# Patient Record
Sex: MALE | Race: WHITE | NOT HISPANIC OR LATINO | Employment: FULL TIME | URBAN - METROPOLITAN AREA
[De-identification: names, ages, dates, MRNs, and addresses within clinical notes are randomized per-mention and may not be internally consistent; named-entity substitution may affect disease eponyms.]

---

## 2021-10-03 ENCOUNTER — HOSPITAL ENCOUNTER (EMERGENCY)
Facility: HOSPITAL | Age: 36
Discharge: HOME | End: 2021-10-03
Admitting: EMERGENCY MEDICINE

## 2021-10-03 VITALS
TEMPERATURE: 98.6 F | DIASTOLIC BLOOD PRESSURE: 65 MMHG | OXYGEN SATURATION: 99 % | RESPIRATION RATE: 16 BRPM | SYSTOLIC BLOOD PRESSURE: 113 MMHG | WEIGHT: 203.93 LBS | HEIGHT: 71 IN | HEART RATE: 100 BPM | BODY MASS INDEX: 28.55 KG/M2

## 2021-10-03 DIAGNOSIS — L02.415 ABSCESS OF RIGHT LEG: Primary | ICD-10-CM

## 2021-10-03 DIAGNOSIS — R50.9 FEVER AND CHILLS: ICD-10-CM

## 2021-10-03 DIAGNOSIS — M79.604 RIGHT LEG PAIN: ICD-10-CM

## 2021-10-03 LAB
ALBUMIN SERPL-MCNC: 3.5 G/DL (ref 3.5–5.2)
ALBUMIN/GLOB SERPL: 0.7 G/DL
ALP SERPL-CCNC: 65 U/L (ref 39–117)
ALT SERPL W P-5'-P-CCNC: 14 U/L (ref 1–41)
ANION GAP SERPL CALCULATED.3IONS-SCNC: 13 MMOL/L (ref 5–15)
AST SERPL-CCNC: 19 U/L (ref 1–40)
BASOPHILS # BLD AUTO: 0 10*3/MM3 (ref 0–0.2)
BASOPHILS NFR BLD AUTO: 0.3 % (ref 0–1.5)
BILIRUB SERPL-MCNC: 0.6 MG/DL (ref 0–1.2)
BUN SERPL-MCNC: 10 MG/DL (ref 6–20)
BUN/CREAT SERPL: 10.1 (ref 7–25)
CALCIUM SPEC-SCNC: 8.4 MG/DL (ref 8.6–10.5)
CHLORIDE SERPL-SCNC: 95 MMOL/L (ref 98–107)
CO2 SERPL-SCNC: 23 MMOL/L (ref 22–29)
CREAT SERPL-MCNC: 0.99 MG/DL (ref 0.76–1.27)
D-LACTATE SERPL-SCNC: 1.2 MMOL/L (ref 0.5–2)
DEPRECATED RDW RBC AUTO: 40.3 FL (ref 37–54)
EOSINOPHIL # BLD AUTO: 0 10*3/MM3 (ref 0–0.4)
EOSINOPHIL NFR BLD AUTO: 0.4 % (ref 0.3–6.2)
ERYTHROCYTE [DISTWIDTH] IN BLOOD BY AUTOMATED COUNT: 13.3 % (ref 12.3–15.4)
GFR SERPL CREATININE-BSD FRML MDRD: 87 ML/MIN/1.73
GLOBULIN UR ELPH-MCNC: 5.1 GM/DL
GLUCOSE SERPL-MCNC: 73 MG/DL (ref 65–99)
HCT VFR BLD AUTO: 37.2 % (ref 37.5–51)
HGB BLD-MCNC: 12.6 G/DL (ref 13–17.7)
LYMPHOCYTES # BLD AUTO: 1.2 10*3/MM3 (ref 0.7–3.1)
LYMPHOCYTES NFR BLD AUTO: 20.6 % (ref 19.6–45.3)
MCH RBC QN AUTO: 28.9 PG (ref 26.6–33)
MCHC RBC AUTO-ENTMCNC: 33.9 G/DL (ref 31.5–35.7)
MCV RBC AUTO: 85.1 FL (ref 79–97)
MONOCYTES # BLD AUTO: 0.8 10*3/MM3 (ref 0.1–0.9)
MONOCYTES NFR BLD AUTO: 14.3 % (ref 5–12)
NEUTROPHILS NFR BLD AUTO: 3.7 10*3/MM3 (ref 1.7–7)
NEUTROPHILS NFR BLD AUTO: 64.4 % (ref 42.7–76)
NRBC BLD AUTO-RTO: 0.2 /100 WBC (ref 0–0.2)
PLATELET # BLD AUTO: 230 10*3/MM3 (ref 140–450)
PMV BLD AUTO: 8.2 FL (ref 6–12)
POTASSIUM SERPL-SCNC: 3.7 MMOL/L (ref 3.5–5.2)
PROT SERPL-MCNC: 8.6 G/DL (ref 6–8.5)
RBC # BLD AUTO: 4.37 10*6/MM3 (ref 4.14–5.8)
SODIUM SERPL-SCNC: 131 MMOL/L (ref 136–145)
WBC # BLD AUTO: 5.8 10*3/MM3 (ref 3.4–10.8)

## 2021-10-03 PROCEDURE — 87070 CULTURE OTHR SPECIMN AEROBIC: CPT | Performed by: NURSE PRACTITIONER

## 2021-10-03 PROCEDURE — 80053 COMPREHEN METABOLIC PANEL: CPT | Performed by: NURSE PRACTITIONER

## 2021-10-03 PROCEDURE — 96375 TX/PRO/DX INJ NEW DRUG ADDON: CPT

## 2021-10-03 PROCEDURE — 87186 SC STD MICRODIL/AGAR DIL: CPT | Performed by: NURSE PRACTITIONER

## 2021-10-03 PROCEDURE — 87205 SMEAR GRAM STAIN: CPT | Performed by: NURSE PRACTITIONER

## 2021-10-03 PROCEDURE — 83605 ASSAY OF LACTIC ACID: CPT

## 2021-10-03 PROCEDURE — 87147 CULTURE TYPE IMMUNOLOGIC: CPT | Performed by: NURSE PRACTITIONER

## 2021-10-03 PROCEDURE — 25010000002 ONDANSETRON PER 1 MG: Performed by: NURSE PRACTITIONER

## 2021-10-03 PROCEDURE — 87150 DNA/RNA AMPLIFIED PROBE: CPT | Performed by: NURSE PRACTITIONER

## 2021-10-03 PROCEDURE — 96374 THER/PROPH/DIAG INJ IV PUSH: CPT

## 2021-10-03 PROCEDURE — 85025 COMPLETE CBC W/AUTO DIFF WBC: CPT | Performed by: NURSE PRACTITIONER

## 2021-10-03 PROCEDURE — 87040 BLOOD CULTURE FOR BACTERIA: CPT | Performed by: NURSE PRACTITIONER

## 2021-10-03 PROCEDURE — 25010000002 HYDROMORPHONE PER 4 MG: Performed by: NURSE PRACTITIONER

## 2021-10-03 PROCEDURE — 99283 EMERGENCY DEPT VISIT LOW MDM: CPT

## 2021-10-03 PROCEDURE — 25010000002 CEFTRIAXONE PER 250 MG: Performed by: NURSE PRACTITIONER

## 2021-10-03 RX ORDER — HYDROMORPHONE HCL 110MG/55ML
1 PATIENT CONTROLLED ANALGESIA SYRINGE INTRAVENOUS ONCE
Status: COMPLETED | OUTPATIENT
Start: 2021-10-03 | End: 2021-10-03

## 2021-10-03 RX ORDER — ONDANSETRON 2 MG/ML
4 INJECTION INTRAMUSCULAR; INTRAVENOUS ONCE
Status: COMPLETED | OUTPATIENT
Start: 2021-10-03 | End: 2021-10-03

## 2021-10-03 RX ORDER — SODIUM CHLORIDE 0.9 % (FLUSH) 0.9 %
10 SYRINGE (ML) INJECTION AS NEEDED
Status: DISCONTINUED | OUTPATIENT
Start: 2021-10-03 | End: 2021-10-03 | Stop reason: HOSPADM

## 2021-10-03 RX ORDER — HYDROCODONE BITARTRATE AND ACETAMINOPHEN 7.5; 325 MG/1; MG/1
1 TABLET ORAL EVERY 6 HOURS PRN
Qty: 15 TABLET | Refills: 0 | Status: ON HOLD | OUTPATIENT
Start: 2021-10-03 | End: 2021-11-06

## 2021-10-03 RX ORDER — ACETAMINOPHEN 500 MG
1000 TABLET ORAL ONCE
Status: COMPLETED | OUTPATIENT
Start: 2021-10-03 | End: 2021-10-03

## 2021-10-03 RX ADMIN — ACETAMINOPHEN 1000 MG: 500 TABLET, FILM COATED ORAL at 20:38

## 2021-10-03 RX ADMIN — ONDANSETRON 4 MG: 2 INJECTION INTRAMUSCULAR; INTRAVENOUS at 20:38

## 2021-10-03 RX ADMIN — HYDROMORPHONE HYDROCHLORIDE 1 MG: 2 INJECTION, SOLUTION INTRAMUSCULAR; INTRAVENOUS; SUBCUTANEOUS at 20:38

## 2021-10-03 RX ADMIN — CEFTRIAXONE 1 G: 10 INJECTION, POWDER, FOR SOLUTION INTRAVENOUS at 20:47

## 2021-10-05 LAB
BACTERIA BLD CULT: ABNORMAL
BACTERIA SPEC AEROBE CULT: ABNORMAL
BOTTLE TYPE: ABNORMAL
GRAM STN SPEC: ABNORMAL
GRAM STN SPEC: ABNORMAL

## 2021-10-05 PROCEDURE — 99284 EMERGENCY DEPT VISIT MOD MDM: CPT

## 2021-10-06 ENCOUNTER — HOSPITAL ENCOUNTER (INPATIENT)
Facility: HOSPITAL | Age: 36
LOS: 1 days | Discharge: HOME | End: 2021-10-06
Attending: EMERGENCY MEDICINE | Admitting: HOSPITALIST

## 2021-10-06 ENCOUNTER — APPOINTMENT (OUTPATIENT)
Dept: CARDIOLOGY | Facility: HOSPITAL | Age: 36
End: 2021-10-06

## 2021-10-06 VITALS
HEIGHT: 71 IN | BODY MASS INDEX: 23.8 KG/M2 | OXYGEN SATURATION: 100 % | WEIGHT: 170 LBS | TEMPERATURE: 98.4 F | DIASTOLIC BLOOD PRESSURE: 90 MMHG | HEART RATE: 92 BPM | SYSTOLIC BLOOD PRESSURE: 133 MMHG | RESPIRATION RATE: 18 BRPM

## 2021-10-06 DIAGNOSIS — R78.81 POSITIVE BLOOD CULTURE: ICD-10-CM

## 2021-10-06 DIAGNOSIS — L02.415 CELLULITIS AND ABSCESS OF RIGHT LOWER EXTREMITY: Primary | ICD-10-CM

## 2021-10-06 DIAGNOSIS — L03.115 CELLULITIS AND ABSCESS OF RIGHT LOWER EXTREMITY: Primary | ICD-10-CM

## 2021-10-06 PROBLEM — B95.62 MRSA BACTEREMIA: Status: ACTIVE | Noted: 2021-10-06

## 2021-10-06 LAB
ALBUMIN SERPL-MCNC: 3.4 G/DL (ref 3.5–5.2)
ALBUMIN/GLOB SERPL: 0.7 G/DL
ALP SERPL-CCNC: 59 U/L (ref 39–117)
ALT SERPL W P-5'-P-CCNC: 14 U/L (ref 1–41)
ANION GAP SERPL CALCULATED.3IONS-SCNC: 14 MMOL/L (ref 5–15)
AST SERPL-CCNC: 24 U/L (ref 1–40)
BASOPHILS # BLD AUTO: 0 10*3/MM3 (ref 0–0.2)
BASOPHILS NFR BLD AUTO: 0.4 % (ref 0–1.5)
BILIRUB SERPL-MCNC: 0.3 MG/DL (ref 0–1.2)
BUN SERPL-MCNC: 11 MG/DL (ref 6–20)
BUN/CREAT SERPL: 9 (ref 7–25)
CALCIUM SPEC-SCNC: 8.5 MG/DL (ref 8.6–10.5)
CHLORIDE SERPL-SCNC: 98 MMOL/L (ref 98–107)
CO2 SERPL-SCNC: 24 MMOL/L (ref 22–29)
CREAT SERPL-MCNC: 1.22 MG/DL (ref 0.76–1.27)
D-LACTATE SERPL-SCNC: 0.5 MMOL/L (ref 0.5–2)
D-LACTATE SERPL-SCNC: 1.2 MMOL/L (ref 0.5–2)
DEPRECATED RDW RBC AUTO: 40.7 FL (ref 37–54)
EOSINOPHIL # BLD AUTO: 0.1 10*3/MM3 (ref 0–0.4)
EOSINOPHIL NFR BLD AUTO: 3 % (ref 0.3–6.2)
ERYTHROCYTE [DISTWIDTH] IN BLOOD BY AUTOMATED COUNT: 13.4 % (ref 12.3–15.4)
GFR SERPL CREATININE-BSD FRML MDRD: 68 ML/MIN/1.73
GLOBULIN UR ELPH-MCNC: 5.2 GM/DL
GLUCOSE BLDC GLUCOMTR-MCNC: 58 MG/DL (ref 70–105)
GLUCOSE BLDC GLUCOMTR-MCNC: 60 MG/DL (ref 70–105)
GLUCOSE SERPL-MCNC: 69 MG/DL (ref 65–99)
HCT VFR BLD AUTO: 34.7 % (ref 37.5–51)
HGB BLD-MCNC: 11.7 G/DL (ref 13–17.7)
HOLD SPECIMEN: NORMAL
HOLD SPECIMEN: NORMAL
LYMPHOCYTES # BLD AUTO: 0.6 10*3/MM3 (ref 0.7–3.1)
LYMPHOCYTES NFR BLD AUTO: 20.3 % (ref 19.6–45.3)
MCH RBC QN AUTO: 29 PG (ref 26.6–33)
MCHC RBC AUTO-ENTMCNC: 33.8 G/DL (ref 31.5–35.7)
MCV RBC AUTO: 85.8 FL (ref 79–97)
MONOCYTES # BLD AUTO: 0.5 10*3/MM3 (ref 0.1–0.9)
MONOCYTES NFR BLD AUTO: 15.8 % (ref 5–12)
NEUTROPHILS NFR BLD AUTO: 1.8 10*3/MM3 (ref 1.7–7)
NEUTROPHILS NFR BLD AUTO: 60.5 % (ref 42.7–76)
NRBC BLD AUTO-RTO: 0.1 /100 WBC (ref 0–0.2)
PLATELET # BLD AUTO: 225 10*3/MM3 (ref 140–450)
PMV BLD AUTO: 8.3 FL (ref 6–12)
POTASSIUM SERPL-SCNC: 3.7 MMOL/L (ref 3.5–5.2)
PROT SERPL-MCNC: 8.6 G/DL (ref 6–8.5)
RBC # BLD AUTO: 4.04 10*6/MM3 (ref 4.14–5.8)
SARS-COV-2 RNA PNL SPEC NAA+PROBE: NOT DETECTED
SODIUM SERPL-SCNC: 136 MMOL/L (ref 136–145)
WBC # BLD AUTO: 3 10*3/MM3 (ref 3.4–10.8)
WHOLE BLOOD HOLD SPECIMEN: NORMAL
WHOLE BLOOD HOLD SPECIMEN: NORMAL

## 2021-10-06 PROCEDURE — 93306 TTE W/DOPPLER COMPLETE: CPT | Performed by: INTERNAL MEDICINE

## 2021-10-06 PROCEDURE — 25010000002 MORPHINE PER 10 MG: Performed by: EMERGENCY MEDICINE

## 2021-10-06 PROCEDURE — 25010000002 ONDANSETRON PER 1 MG: Performed by: EMERGENCY MEDICINE

## 2021-10-06 PROCEDURE — 82962 GLUCOSE BLOOD TEST: CPT

## 2021-10-06 PROCEDURE — 25010000002 VANCOMYCIN 10 G RECONSTITUTED SOLUTION: Performed by: EMERGENCY MEDICINE

## 2021-10-06 PROCEDURE — 80053 COMPREHEN METABOLIC PANEL: CPT | Performed by: EMERGENCY MEDICINE

## 2021-10-06 PROCEDURE — 99222 1ST HOSP IP/OBS MODERATE 55: CPT | Performed by: HOSPITALIST

## 2021-10-06 PROCEDURE — 87040 BLOOD CULTURE FOR BACTERIA: CPT | Performed by: EMERGENCY MEDICINE

## 2021-10-06 PROCEDURE — 25010000002 CEFEPIME PER 500 MG: Performed by: HOSPITALIST

## 2021-10-06 PROCEDURE — 99222 1ST HOSP IP/OBS MODERATE 55: CPT | Performed by: SURGERY

## 2021-10-06 PROCEDURE — 83605 ASSAY OF LACTIC ACID: CPT | Performed by: NURSE PRACTITIONER

## 2021-10-06 PROCEDURE — 25010000002 VANCOMYCIN PER 500 MG: Performed by: EMERGENCY MEDICINE

## 2021-10-06 PROCEDURE — 25010000002 VANCOMYCIN 1 G RECONSTITUTED SOLUTION 1 EACH VIAL: Performed by: HOSPITALIST

## 2021-10-06 PROCEDURE — U0003 INFECTIOUS AGENT DETECTION BY NUCLEIC ACID (DNA OR RNA); SEVERE ACUTE RESPIRATORY SYNDROME CORONAVIRUS 2 (SARS-COV-2) (CORONAVIRUS DISEASE [COVID-19]), AMPLIFIED PROBE TECHNIQUE, MAKING USE OF HIGH THROUGHPUT TECHNOLOGIES AS DESCRIBED BY CMS-2020-01-R: HCPCS | Performed by: NURSE PRACTITIONER

## 2021-10-06 PROCEDURE — 25010000002 PIPERACILLIN SOD-TAZOBACTAM PER 1 G: Performed by: EMERGENCY MEDICINE

## 2021-10-06 PROCEDURE — 83605 ASSAY OF LACTIC ACID: CPT

## 2021-10-06 PROCEDURE — 36415 COLL VENOUS BLD VENIPUNCTURE: CPT | Performed by: NURSE PRACTITIONER

## 2021-10-06 PROCEDURE — 85025 COMPLETE CBC W/AUTO DIFF WBC: CPT | Performed by: EMERGENCY MEDICINE

## 2021-10-06 PROCEDURE — 93306 TTE W/DOPPLER COMPLETE: CPT

## 2021-10-06 RX ORDER — ERGOCALCIFEROL 1.25 MG/1
50000 CAPSULE ORAL WEEKLY
COMMUNITY

## 2021-10-06 RX ORDER — DOXYCYCLINE HYCLATE 100 MG/1
100 CAPSULE ORAL 2 TIMES DAILY
Qty: 28 CAPSULE | Refills: 0 | Status: ON HOLD | OUTPATIENT
Start: 2021-10-06 | End: 2021-11-06

## 2021-10-06 RX ORDER — ONDANSETRON 4 MG/1
4 TABLET, FILM COATED ORAL EVERY 6 HOURS PRN
Status: DISCONTINUED | OUTPATIENT
Start: 2021-10-06 | End: 2021-10-06 | Stop reason: HOSPADM

## 2021-10-06 RX ORDER — VANCOMYCIN 1.75 GRAM/500 ML IN 0.9 % SODIUM CHLORIDE INTRAVENOUS
20 ONCE
Status: COMPLETED | OUTPATIENT
Start: 2021-10-06 | End: 2021-10-06

## 2021-10-06 RX ORDER — DARUNAVIR ETHANOLATE AND COBICISTAT 800; 150 MG/1; MG/1
1 TABLET, FILM COATED ORAL DAILY
COMMUNITY

## 2021-10-06 RX ORDER — CHOLECALCIFEROL (VITAMIN D3) 125 MCG
5 CAPSULE ORAL NIGHTLY PRN
Status: DISCONTINUED | OUTPATIENT
Start: 2021-10-06 | End: 2021-10-06 | Stop reason: HOSPADM

## 2021-10-06 RX ORDER — EMTRICITABINE AND TENOFOVIR ALAFENAMIDE 200; 25 MG/1; MG/1
1 TABLET ORAL DAILY
COMMUNITY

## 2021-10-06 RX ORDER — DEXTROAMPHETAMINE SACCHARATE, AMPHETAMINE ASPARTATE, DEXTROAMPHETAMINE SULFATE AND AMPHETAMINE SULFATE 7.5; 7.5; 7.5; 7.5 MG/1; MG/1; MG/1; MG/1
30 TABLET ORAL 2 TIMES DAILY
COMMUNITY
Start: 2021-08-24 | End: 2021-11-09 | Stop reason: HOSPADM

## 2021-10-06 RX ORDER — SODIUM CHLORIDE 9 MG/ML
100 INJECTION, SOLUTION INTRAVENOUS CONTINUOUS
Status: DISCONTINUED | OUTPATIENT
Start: 2021-10-06 | End: 2021-10-06 | Stop reason: HOSPADM

## 2021-10-06 RX ORDER — SODIUM CHLORIDE 0.9 % (FLUSH) 0.9 %
10 SYRINGE (ML) INJECTION AS NEEDED
Status: DISCONTINUED | OUTPATIENT
Start: 2021-10-06 | End: 2021-10-06 | Stop reason: HOSPADM

## 2021-10-06 RX ORDER — HYDROXYZINE HYDROCHLORIDE 25 MG/1
25 TABLET, FILM COATED ORAL 3 TIMES DAILY PRN
Status: DISCONTINUED | OUTPATIENT
Start: 2021-10-06 | End: 2021-10-06 | Stop reason: HOSPADM

## 2021-10-06 RX ORDER — ACETAMINOPHEN 160 MG/5ML
650 SOLUTION ORAL EVERY 4 HOURS PRN
Status: DISCONTINUED | OUTPATIENT
Start: 2021-10-06 | End: 2021-10-06 | Stop reason: HOSPADM

## 2021-10-06 RX ORDER — ACETAMINOPHEN 325 MG/1
650 TABLET ORAL EVERY 4 HOURS PRN
Status: DISCONTINUED | OUTPATIENT
Start: 2021-10-06 | End: 2021-10-06 | Stop reason: HOSPADM

## 2021-10-06 RX ORDER — SODIUM HYPOCHLORITE 1.25 MG/ML
SOLUTION TOPICAL 2 TIMES DAILY
Status: DISCONTINUED | OUTPATIENT
Start: 2021-10-06 | End: 2021-10-06 | Stop reason: HOSPADM

## 2021-10-06 RX ORDER — ONDANSETRON 2 MG/ML
4 INJECTION INTRAMUSCULAR; INTRAVENOUS ONCE
Status: COMPLETED | OUTPATIENT
Start: 2021-10-06 | End: 2021-10-06

## 2021-10-06 RX ORDER — BUSPIRONE HYDROCHLORIDE 30 MG/1
30 TABLET ORAL 2 TIMES DAILY
COMMUNITY
Start: 2021-07-09

## 2021-10-06 RX ORDER — SODIUM CHLORIDE 0.9 % (FLUSH) 0.9 %
3 SYRINGE (ML) INJECTION EVERY 12 HOURS SCHEDULED
Status: DISCONTINUED | OUTPATIENT
Start: 2021-10-06 | End: 2021-10-06 | Stop reason: HOSPADM

## 2021-10-06 RX ORDER — MORPHINE SULFATE 4 MG/ML
4 INJECTION, SOLUTION INTRAMUSCULAR; INTRAVENOUS ONCE
Status: COMPLETED | OUTPATIENT
Start: 2021-10-06 | End: 2021-10-06

## 2021-10-06 RX ORDER — ONDANSETRON 2 MG/ML
4 INJECTION INTRAMUSCULAR; INTRAVENOUS EVERY 6 HOURS PRN
Status: DISCONTINUED | OUTPATIENT
Start: 2021-10-06 | End: 2021-10-06 | Stop reason: HOSPADM

## 2021-10-06 RX ORDER — ERGOCALCIFEROL 1.25 MG/1
50000 CAPSULE ORAL WEEKLY
Status: DISCONTINUED | OUTPATIENT
Start: 2021-10-07 | End: 2021-10-06 | Stop reason: HOSPADM

## 2021-10-06 RX ORDER — CEPHALEXIN 500 MG/1
500 CAPSULE ORAL 2 TIMES DAILY
COMMUNITY
Start: 2021-10-02 | End: 2021-10-06 | Stop reason: HOSPADM

## 2021-10-06 RX ORDER — BUSPIRONE HYDROCHLORIDE 15 MG/1
30 TABLET ORAL EVERY 12 HOURS SCHEDULED
Status: DISCONTINUED | OUTPATIENT
Start: 2021-10-06 | End: 2021-10-06 | Stop reason: HOSPADM

## 2021-10-06 RX ORDER — SULFAMETHOXAZOLE AND TRIMETHOPRIM 800; 160 MG/1; MG/1
1 TABLET ORAL 2 TIMES DAILY
COMMUNITY
Start: 2021-10-02 | End: 2021-10-06 | Stop reason: HOSPADM

## 2021-10-06 RX ORDER — SODIUM CHLORIDE 0.9 % (FLUSH) 0.9 %
3-10 SYRINGE (ML) INJECTION AS NEEDED
Status: DISCONTINUED | OUTPATIENT
Start: 2021-10-06 | End: 2021-10-06 | Stop reason: HOSPADM

## 2021-10-06 RX ORDER — ACETAMINOPHEN 650 MG/1
650 SUPPOSITORY RECTAL EVERY 4 HOURS PRN
Status: DISCONTINUED | OUTPATIENT
Start: 2021-10-06 | End: 2021-10-06 | Stop reason: HOSPADM

## 2021-10-06 RX ORDER — HYDROCODONE BITARTRATE AND ACETAMINOPHEN 7.5; 325 MG/1; MG/1
1 TABLET ORAL EVERY 6 HOURS PRN
Status: DISCONTINUED | OUTPATIENT
Start: 2021-10-06 | End: 2021-10-06 | Stop reason: HOSPADM

## 2021-10-06 RX ADMIN — MORPHINE SULFATE 4 MG: 4 INJECTION INTRAVENOUS at 01:32

## 2021-10-06 RX ADMIN — VANCOMYCIN HYDROCHLORIDE 1000 MG: 1 INJECTION, POWDER, LYOPHILIZED, FOR SOLUTION INTRAVENOUS at 14:53

## 2021-10-06 RX ADMIN — VANCOMYCIN HYDROCHLORIDE 1750 MG: 500 INJECTION, POWDER, LYOPHILIZED, FOR SOLUTION INTRAVENOUS at 01:32

## 2021-10-06 RX ADMIN — DAKIN'S SOLUTION 0.125% (QUARTER STRENGTH): 0.12 SOLUTION at 12:00

## 2021-10-06 RX ADMIN — PIPERACILLIN AND TAZOBACTAM 3.38 G: 3; .375 INJECTION, POWDER, LYOPHILIZED, FOR SOLUTION INTRAVENOUS at 01:32

## 2021-10-06 RX ADMIN — BUSPIRONE HYDROCHLORIDE 30 MG: 15 TABLET ORAL at 14:53

## 2021-10-06 RX ADMIN — SODIUM CHLORIDE 1000 ML: 9 INJECTION, SOLUTION INTRAVENOUS at 01:32

## 2021-10-06 RX ADMIN — ONDANSETRON 4 MG: 2 INJECTION INTRAMUSCULAR; INTRAVENOUS at 01:32

## 2021-10-06 RX ADMIN — Medication 3 ML: at 11:19

## 2021-10-06 RX ADMIN — SODIUM CHLORIDE 100 ML/HR: 9 INJECTION, SOLUTION INTRAVENOUS at 03:34

## 2021-10-06 RX ADMIN — Medication 3 ML: at 03:35

## 2021-10-06 RX ADMIN — CEFEPIME HYDROCHLORIDE 2 G: 2 INJECTION, POWDER, FOR SOLUTION INTRAVENOUS at 11:18

## 2021-10-06 NOTE — NURSING NOTE
WOCN note:    36 yr old male admitted 10/6/21 for MRSA positive blood and wound cultures from an abscess to right posterior upper thigh. Patient had an I&D in the ED on 10/2 and area of erythema was marked at that time.     Patient has packing gauze in place draining moderate amount serosanguineous drainage. Induration noted to lateral edge of wound but no erythema. Wound measures 1x1x2.5 and is tender to touch.     Recommend lightly packing with Dakins moistened strip gauze or 2x2 twice daily. Will continue to follow as needed.

## 2021-10-06 NOTE — ED PROVIDER NOTES
Subjective   36-year-old male who was seen here on Sunday for right posterior thigh abscess/cellulitis, underwent incision and drainage.  Patient was discharged on Keflex/Bactrim/Norco.  Patient states his thigh actually feels a little better in terms of pain.  Patient did develop some diaphoresis and subjective fever this afternoon.  Patient had Covid in late September but feels like he is recovered from that the most part.  Patient denies any history of recurrent abscesses or soft tissue infection.  Symptoms are overall moderate, worse with movement.  Patient had wound culture positive for MRSA sensitive to vancomycin and Bactrim.  Patient reports compliance with his antibiotics.  Blood culture positive as well.          Review of Systems   Constitutional: Positive for fever.   Musculoskeletal:        As per HPI   Skin: Positive for wound.   All other systems reviewed and are negative.      No past medical history on file.    No Known Allergies    No past surgical history on file.    No family history on file.    Social History     Socioeconomic History   • Marital status: Single     Spouse name: Not on file   • Number of children: Not on file   • Years of education: Not on file   • Highest education level: Not on file           Objective   Physical Exam  Constitutional:       Appearance: Normal appearance.   HENT:      Head: Normocephalic and atraumatic.      Mouth/Throat:      Mouth: Mucous membranes are moist.      Pharynx: Oropharynx is clear.   Eyes:      Conjunctiva/sclera: Conjunctivae normal.      Pupils: Pupils are equal, round, and reactive to light.   Cardiovascular:      Rate and Rhythm: Tachycardia present.      Pulses: Normal pulses.      Heart sounds: Normal heart sounds.   Pulmonary:      Effort: Pulmonary effort is normal.      Breath sounds: Normal breath sounds.   Abdominal:      General: Bowel sounds are normal. There is no distension.      Palpations: Abdomen is soft.      Tenderness: There is  no abdominal tenderness.   Musculoskeletal:      Comments: Right posterior thigh with packed wound with surrounding erythema and some blistering at the cephalad aspect.  Erythema encompasses most of the posterior thigh.  There is approximately a 5 cm halo of induration and tenderness around the wound.  Packing was removed, a mild amount of purulence can be expressed but I am not convinced further incision will assist this gentleman.  Wound was repacked, redness will be outlined with wound pen   Skin:     Capillary Refill: Capillary refill takes less than 2 seconds.   Neurological:      General: No focal deficit present.      Mental Status: He is alert and oriented to person, place, and time.   Psychiatric:         Mood and Affect: Mood normal.         Behavior: Behavior normal.         Procedures           ED Course                                           MDM  Number of Diagnoses or Management Options  Cellulitis and abscess of right lower extremity  Positive blood culture  Diagnosis management comments: Results for orders placed or performed during the hospital encounter of 10/06/21  -Comprehensive Metabolic Panel  Specimen: Blood       Result                      Value             Ref Range           Glucose                     69                65 - 99 mg/dL       BUN                         11                6 - 20 mg/dL        Creatinine                  1.22              0.76 - 1.27 *       Sodium                      136               136 - 145 mm*       Potassium                   3.7               3.5 - 5.2 mm*       Chloride                    98                98 - 107 mmo*       CO2                         24.0              22.0 - 29.0 *       Calcium                     8.5 (L)           8.6 - 10.5 m*       Total Protein               8.6 (H)           6.0 - 8.5 g/*       Albumin                     3.40 (L)          3.50 - 5.20 *       ALT (SGPT)                  14                1 - 41 U/L           AST (SGOT)                  24                1 - 40 U/L          Alkaline Phosphatase        59                39 - 117 U/L        Total Bilirubin             0.3               0.0 - 1.2 mg*       eGFR Non  Amer       68                >60 mL/min/1*       Globulin                    5.2               gm/dL               A/G Ratio                   0.7               g/dL                BUN/Creatinine Ratio        9.0               7.0 - 25.0          Anion Gap                   14.0              5.0 - 15.0 m*  -CBC Auto Differential  Specimen: Blood       Result                      Value             Ref Range           WBC                         3.00 (L)          3.40 - 10.80*       RBC                         4.04 (L)          4.14 - 5.80 *       Hemoglobin                  11.7 (L)          13.0 - 17.7 *       Hematocrit                  34.7 (L)          37.5 - 51.0 %       MCV                         85.8              79.0 - 97.0 *       MCH                         29.0              26.6 - 33.0 *       MCHC                        33.8              31.5 - 35.7 *       RDW                         13.4              12.3 - 15.4 %       RDW-SD                      40.7              37.0 - 54.0 *       MPV                         8.3               6.0 - 12.0 fL       Platelets                   225               140 - 450 10*       Neutrophil %                60.5              42.7 - 76.0 %       Lymphocyte %                20.3              19.6 - 45.3 %       Monocyte %                  15.8 (H)          5.0 - 12.0 %        Eosinophil %                3.0               0.3 - 6.2 %         Basophil %                  0.4               0.0 - 1.5 %         Neutrophils, Absolute       1.80              1.70 - 7.00 *       Lymphocytes, Absolute       0.60 (L)          0.70 - 3.10 *       Monocytes, Absolute         0.50              0.10 - 0.90 *       Eosinophils, Absolute       0.10              0.00 - 0.40  *       Basophils, Absolute         0.00              0.00 - 0.20 *       nRBC                        0.1               0.0 - 0.2 /1*  -POC Lactate  Specimen: Blood       Result                      Value             Ref Range           Lactate                     1.2               0.5 - 2.0 mm*  -Green Top (Gel)       Result                      Value             Ref Range           Extra Tube                  hold                             -Lavender Top       Result                      Value             Ref Range           Extra Tube                                                    hold for add-on  -Gold Top - SST       Result                      Value             Ref Range           Extra Tube                                                    Hold for add-ons.  -Light Blue Top       Result                      Value             Ref Range           Extra Tube                                                    hold for add-on    Patient hemodynamically stable with 1/2+ blood cultures, will observe for IV antibiotics       Amount and/or Complexity of Data Reviewed  Clinical lab tests: ordered and reviewed  Tests in the medicine section of CPT®: ordered and reviewed  Decide to obtain previous medical records or to obtain history from someone other than the patient: yes        Final diagnoses:   Cellulitis and abscess of right lower extremity   Positive blood culture       ED Disposition  ED Disposition     ED Disposition Condition Comment    Decision to Admit  Level of Care: Med/Surg [1]   Admitting Physician: CHITRA BUSH [[id number]]            No follow-up provider specified.       Medication List      No changes were made to your prescriptions during this visit.          Adis Webster MD  10/06/21 [id number]

## 2021-10-06 NOTE — H&P
[facility] Medicine Services      Patient Name: Wong Funez  : 1985  MRN: [id number]  Primary Care Physician:  Provider, No Known  Date of admission: 10/6/2021      Subjective      Chief Complaint: Positive blood cultures    History of Present Illness: Wong Funez is a 36 y.o. male who presented to [facility] on 10/6/2021 after being notified of results of blood and wound cultures which were positive for MRSA.  He was seen here in the emergency department on 2021 with complaints of a right posterior upper thigh wound.  He reports he thought it was an ingrown hair which has become infected.  He was placed on Keflex and Bactrim and then received a phone call to return to the hospital tonight.  Per report, he has a small wound in his right upper posterior thigh and the large area of erythema which extends from the gluteal fold to behind the knee.  He was apparently incised and drained in the emergency department on 10-21 and also some purulent material was expressed from at this evening in the emergency department.  He reports some tenderness to the area and subjective fever and chills and diaphoresis.  He is afebrile here tonight.  He was mildly tachycardic upon admission lactic is 1.2.  He was given 1000 IV fluid bolus in ED. WBC is 3.0, hemoglobin 11.7.   He was started on IV vancomycin and IV cefepime in the emergency department and will be admitted for further evaluation and treatment.  He has a past medical history of HIV and reports he is on antiretroviral medication.  Review of Systems   Constitutional: Positive for chills and diaphoresis.   HENT: Negative.    Eyes: Negative.    Cardiovascular: Negative.    Respiratory: Negative.    Endocrine: Negative.    Hematologic/Lymphatic: Negative.    Skin: Negative.    Musculoskeletal: Negative.    Gastrointestinal: Negative.    Genitourinary: Negative.    Neurological: Negative.    Psychiatric/Behavioral:  Negative.    Allergic/Immunologic: Positive for HIV exposure.        Personal History     Past Medical History:   Diagnosis Date   • HIV (human immunodeficiency virus infection) ([facility])        History reviewed. No pertinent surgical history.    Family History: family history includes No Known Problems in his father and mother. Otherwise pertinent FHx was reviewed and not pertinent to current issue.    Social History:  reports that he has never smoked. He does not have any smokeless tobacco history on file. He reports previous alcohol use. He reports previous drug use.    Home Medications:  Prior to Admission Medications     Prescriptions Last Dose Informant Patient Reported? Taking?    HYDROcodone-acetaminophen (Norco) 7.5-325 MG per tablet   No No    Take 1 tablet by mouth Every 6 (Six) Hours As Needed for Moderate Pain .            Allergies:  No Known Allergies    Objective      Vitals:   Temp:  [97.6 °F (36.4 °C)] 97.6 °F (36.4 °C)  Heart Rate:  [107-136] 110  Resp:  [18] 18  BP: (115-148)/(68-92) 123/75    Physical Exam  Vitals reviewed.   Constitutional:       Appearance: Normal appearance.   HENT:      Head: Normocephalic and atraumatic.      Right Ear: External ear normal.      Left Ear: External ear normal.      Nose: Nose normal.      Mouth/Throat:      Mouth: Mucous membranes are moist.   Eyes:      Extraocular Movements: Extraocular movements intact.   Cardiovascular:      Rate and Rhythm: Normal rate and regular rhythm.      Pulses: Normal pulses.      Heart sounds: Normal heart sounds.   Pulmonary:      Effort: Pulmonary effort is normal.      Breath sounds: Normal breath sounds.   Abdominal:      Palpations: Abdomen is soft.   Genitourinary:     Comments: deferred  Musculoskeletal:         General: Swelling and tenderness present.      Cervical back: Normal range of motion and neck supple.      Comments: Right posterior lower extremity   Skin:     Findings: Erythema and lesion present.      Comments: 8  posterior upper thigh   Neurological:      General: No focal deficit present.      Mental Status: He is alert and oriented to person, place, and time.   Psychiatric:         Mood and Affect: Mood normal.         Behavior: Behavior normal.         Thought Content: Thought content normal.         Judgment: Judgment normal.          Result Review    Result Review:  I have personally reviewed the results from the time of this admission to 10/6/2021 03:36 EDT and agree with these findings:  [x]  Laboratory  [x]  Microbiology  []  Radiology  []  EKG/Telemetry   []  Cardiology/Vascular   []  Pathology  [x]  Old records  []  Other:  Most notable findings include: WBC 3.0, lactic 1.2, blood and wound culture positive for MRSA      Assessment/Plan        Active Hospital Problems:  Active Hospital Problems    Diagnosis    • Cellulitis and abscess of right lower extremity      Plan:    Cellulitis and abscess of the right lower extremity blood and wound cultures came back positive for MRSA, continue IV vancomycin and IV Zosyn pharmacy to dose, normal saline at 100 cc/h, Norco 5/325 every 4 hours as needed pain, consider general surgery consult for deep incision and drainage if appropriate    HIV, reports on antiretroviral medication home meds unverified at this time reorder pending verification by pharmacy    Anxiety, 25 mg hydroxyzine every 8 hours as needed anxiety    DVT prophylaxis:  Mechanical DVT prophylaxis orders are present.    CODE STATUS:    Code Status: CPR  Medical Interventions (Level of Support Prior to Arrest): Full    Admission Status:  I believe this patient meets inpatient status.    I discussed the patient's findings and my recommendations with patient.    This patient has been examined wearing appropriate Personal Protective Equipment     Signature: Electronically signed by ARMEN Hunter, 10/06/21, 3:48 AM EDT.    Hospitalist /Attending note.  Patient seen and examined, chart reviewed.  Agree  with above.  36-year-old male coming in with abscess involving the right thigh posteriorly, patient underwent bedside incision incision and drainage, patient's wound and blood culture has revealed MRSA.  Wound cultures likely the source for bacteremia.  Patient afebrile, WBC count normal, patient appears very nontoxic.  Will discharge the patient on oral doxycycline 100 mg p.o. 2 times a day for 2 weeks with local wound care, will obtain 2D echo.  Patient will follow up with me in office next week.  Patient has made the appointment already.    Vitals reviewed  Chest bilateral entry, normal vesicular breathing  Cardiovascular, S1-S2 there is no murmur  Abdomen soft nontender good sounds audible  Lower extremity revealed abscess involving the posterior part of the time    Impression  Thigh abscess posteriorly status post incision and drainage secondary to MRSA  MRSA bacteremia secondary to above    Plan  Will discharge the patient on oral doxycycline with follow-up outpatient with the PCP and also with surgical services, patient agreeable to discharge  Condition at discharge stable    Jan Lock MD.

## 2021-10-06 NOTE — DISCHARGE SUMMARY
[facility] Medicine Services  DISCHARGE SUMMARY    Patient Name: Wong Funez  : 1985  MRN: [id number]    Date of Admission: 10/6/2021  Date of Discharge:  10/6/2021  Primary Care Physician: Provider, No Known      Presenting Problem:   Positive blood culture [R78.81]  Cellulitis and abscess of right lower extremity [L03.115, L02.415]    Active and Resolved Hospital Problems:  Active Hospital Problems    Diagnosis POA   • Cellulitis and abscess of right lower extremity [L03.115, L02.415] Yes   • MRSA bacteremia [R78.81, B95.62] Unknown      Resolved Hospital Problems   No resolved problems to display.         Hospital Course     Hospital Course:    Wong Funez is a 36 y.o. male admitted for abscess involving the thigh region posteriorly, patient underwent incision and drainage, wound culture revealed MRSA, blood cultures also revealed MRSA.  Patient was getting treated with Bactrim outpatient, changed to oral doxycycline on discharge in total for 2 weeks antibiotic therapy.  Surgical services saw the patient, they advised local wound care with outpatient follow-up.  Patient afebrile, WBC count within normal limits.  Patient agreeable to discharge.    Condition on discharge stable    Spent 33 minutes in arranging for the discharge        DISCHARGE Follow Up Recommendations with PCP in a week time  Follow-up with wound care clinic/surgical care clinic in a week time    Reasons For Change In Medications and Indications for New Medications:      Day of Discharge     Vital Signs:  Temp:  [97.6 °F (36.4 °C)-98.8 °F (37.1 °C)] 98.1 °F (36.7 °C)  Heart Rate:  [] 92  Resp:  [18] 18  BP: (115-148)/(61-92) 123/61    Physical Exam:  Physical Exam  Vitals and nursing note reviewed.   Constitutional:       General: He is not in acute distress.     Appearance: Normal appearance. He is well-developed. He is not ill-appearing, toxic-appearing or diaphoretic.   HENT:      Head:  Normocephalic and atraumatic.      Right Ear: Ear canal and external ear normal.      Left Ear: Ear canal and external ear normal.      Nose: Nose normal. No congestion or rhinorrhea.      Mouth/Throat:      Mouth: Mucous membranes are moist.      Pharynx: No oropharyngeal exudate.   Eyes:      General: No scleral icterus.        Right eye: No discharge.         Left eye: No discharge.      Extraocular Movements: Extraocular movements intact.      Conjunctiva/sclera: Conjunctivae normal.      Pupils: Pupils are equal, round, and reactive to light.   Neck:      Thyroid: No thyromegaly.      Vascular: No carotid bruit or JVD.      Trachea: No tracheal deviation.   Cardiovascular:      Rate and Rhythm: Normal rate and regular rhythm.      Pulses: Normal pulses.      Heart sounds: Normal heart sounds. No murmur heard.   No friction rub. No gallop.    Pulmonary:      Effort: Pulmonary effort is normal. No respiratory distress.      Breath sounds: Normal breath sounds. No stridor. No wheezing, rhonchi or rales.   Chest:      Chest wall: No tenderness.   Abdominal:      General: Bowel sounds are normal. There is no distension.      Palpations: Abdomen is soft. There is no mass.      Tenderness: There is no abdominal tenderness. There is no guarding or rebound.      Hernia: No hernia is present.   Musculoskeletal:         General: No swelling, tenderness, deformity or signs of injury. Normal range of motion.      Cervical back: Normal range of motion and neck supple. No rigidity. No muscular tenderness.      Right lower leg: No edema.      Left lower leg: No edema.   Lymphadenopathy:      Cervical: No cervical adenopathy.   Skin:     General: Skin is warm and dry.      Coloration: Skin is not jaundiced or pale.      Findings: No bruising, erythema or rash.   Neurological:      General: No focal deficit present.      Mental Status: He is alert and oriented to person, place, and time. Mental status is at baseline.       Cranial Nerves: No cranial nerve deficit.      Sensory: No sensory deficit.      Motor: No weakness or abnormal muscle tone.      Coordination: Coordination normal.   Psychiatric:         Mood and Affect: Mood normal.         Behavior: Behavior normal.         Thought Content: Thought content normal.         Judgment: Judgment normal.              Pertinent  and/or Most Recent Results     LAB RESULTS:      Lab 10/06/21  0917 10/06/21  0103 10/06/21  0059 10/03/21  2026 10/03/21  2015   WBC  --   --  3.00*  --  5.80   HEMOGLOBIN  --   --  11.7*  --  12.6*   HEMATOCRIT  --   --  34.7*  --  37.2*   PLATELETS  --   --  225  --  230   NEUTROS ABS  --   --  1.80  --  3.70   LYMPHS ABS  --   --  0.60*  --  1.20   MONOS ABS  --   --  0.50  --  0.80   EOS ABS  --   --  0.10  --  0.00   MCV  --   --  85.8  --  85.1   LACTATE 0.5 1.2  --  1.2  --          Lab 10/06/21  0059 10/03/21  2015   SODIUM 136 131*   POTASSIUM 3.7 3.7   CHLORIDE 98 95*   CO2 24.0 23.0   ANION GAP 14.0 13.0   BUN 11 10   CREATININE 1.22 0.99   GLUCOSE 69 73   CALCIUM 8.5* 8.4*         Lab 10/06/21  0059 10/03/21  2015   TOTAL PROTEIN 8.6* 8.6*   ALBUMIN 3.40* 3.50   GLOBULIN 5.2 5.1   ALT (SGPT) 14 14   AST (SGOT) 24 19   BILIRUBIN 0.3 0.6   ALK PHOS 59 65                     Brief Urine Lab Results     None        Microbiology Results (last 10 days)     Procedure Component Value - Date/Time    COVID PRE-OP / PRE-PROCEDURE SCREENING ORDER (NO ISOLATION) - Swab, Nasopharynx [[id number]]  (Normal) Collected: 10/06/21 0533    Lab Status: Final result Specimen: Swab from Nasopharynx Updated: 10/06/21 0725    Narrative:      The following orders were created for panel order COVID PRE-OP / PRE-PROCEDURE SCREENING ORDER (NO ISOLATION) - Swab, Nasopharynx.  Procedure                               Abnormality         Status                     ---------                               -----------         ------                      COVID-19,CEPHEID/JOSE/BD...[[id number]]  Normal              Final result                 Please view results for these tests on the individual orders.    COVID-19,CEPHEID/JOSE/BDMAX,COR/[facility]/PAD/DANTE IN-HOUSE(OR EMERGENT/ADD-ON),NP SWAB IN TRANSPORT MEDIA 3-4 HR TAT, RT-PCR - Swab, Nasopharynx [[id number]]  (Normal) Collected: 10/06/21 0533    Lab Status: Final result Specimen: Swab from Nasopharynx Updated: 10/06/21 0725     COVID19 Not Detected    Narrative:      Fact sheet for providers: https://www.fda.gov/media/882093/download     Fact sheet for patients: https://www.fda.gov/media/318926/download  Fact sheet for providers: https://www.fda.gov/media/823786/download     Fact sheet for patients: https://www.fda.gov/media/013077/download    Blood Culture - Blood, Arm, Left [[id number]]  (Abnormal) Collected: 10/03/21 2047    Lab Status: Preliminary result Specimen: Blood from Arm, Left Updated: 10/06/21 0908     Blood Culture Staphylococcus aureus, MRSA     Comment: Infectious disease consultation is highly recommended to rule out distant foci of infection.  Methicillin resistant Staphylococcus aureus, Patient may be an isolation risk.        Isolated from Anaerobic Bottle     Gram Stain Anaerobic Bottle Gram positive cocci in clusters    Blood Culture ID, PCR - Blood, Arm, Left [[id number]]  (Abnormal) Collected: 10/03/21 2047    Lab Status: Final result Specimen: Blood from Arm, Left Updated: 10/05/21 0957     BCID, PCR Staph aureus. mecA/C and MREJ (methicillin resistance gene) detected. Identification by BCID2 PCR.     BOTTLE TYPE Anaerobic Bottle    Narrative:      Infectious disease consultation is highly recommended to rule out distant foci of infection.    Blood Culture - Blood, Arm, Left [[id number]] Collected: 10/03/21 2015    Lab Status: Preliminary result Specimen: Blood from Arm, Left Updated: 10/05/21 2030     Blood Culture No growth at 2 days    Wound Culture - Drainage, Leg, Right [[id number]]   (Abnormal)  (Susceptibility) Collected: 10/03/21 2015    Lab Status: Final result Specimen: Drainage from Leg, Right Updated: 10/05/21 1059     Wound Culture Scant growth (1+) Staphylococcus aureus, MRSA     Comment: Methicillin resistant Staphylococcus aureus, Patient may be an isolation risk.        Gram Stain Rare (1+) WBCs per low power field      Rare (1+) Gram positive cocci in pairs    Susceptibility      Staphylococcus aureus, MRSA      JORGE L      Clindamycin Susceptible      Erythromycin Resistant      Inducible Clindamycin Resistance Negative      Oxacillin Resistant      Penicillin G Resistant      Rifampin Susceptible      Tetracycline Susceptible      Trimethoprim + Sulfamethoxazole Susceptible      Vancomycin Susceptible               Linear View               Susceptibility Comments     Staphylococcus aureus, MRSA    This isolate does not demonstrate inducible clindamycin resistance in vitro.                                      Labs Pending at Discharge:  Pending Labs     Order Current Status    Blood Culture - Blood, Arm, Right In process    Blood Culture - Blood, Arm, Right In process          Procedures Performed           Consults:   Consults     Date and Time Order Name Status Description    10/6/2021  9:58 AM Inpatient General Surgery Consult Completed             Discharge Details        Discharge Medications      New Medications      Instructions Start Date   doxycycline 100 MG capsule  Commonly known as: VIBRAMYCIN   100 mg, Oral, 2 Times Daily         Continue These Medications      Instructions Start Date   amphetamine-dextroamphetamine 30 MG tablet  Commonly known as: ADDERALL   30 mg, Oral, 2 times daily      busPIRone 30 MG tablet  Commonly known as: BUSPAR   30 mg, Oral, 2 times daily      Descovy 200-25 MG per tablet  Generic drug: Emtricitabine-Tenofovir AF   1 tablet, Oral, Daily      HYDROcodone-acetaminophen 7.5-325 MG per tablet  Commonly known as: Norco   1 tablet, Oral, Every 6  Hours PRN      mupirocin 2 % ointment  Commonly known as: BACTROBAN   1 application, Topical, 3 Times Daily, Right upper thigh      Prezcobix 800-150 MG per tablet  Generic drug: Darunavir-Cobicistat   1 tablet, Oral, Daily      vitamin D 1.25 MG (77187 UT) capsule capsule  Commonly known as: ERGOCALCIFEROL   50,000 Units, Oral, Weekly         Stop These Medications    cephalexin 500 MG capsule  Commonly known as: KEFLEX     sulfamethoxazole-trimethoprim 800-160 MG per tablet  Commonly known as: BACTRIM DS,SEPTRA DS            No Known Allergies      Discharge Disposition:   Home or Self Care    Diet:  Hospital:  Diet Order   Procedures   • Diet Regular         Discharge Activity:         CODE STATUS:  Code Status and Medical Interventions:   Ordered at: 10/06/21 0256     Code Status:    CPR     Medical Interventions (Level of Support Prior to Arrest):    Full         Future Appointments   Date Time Provider Department Center   10/14/2021  8:00 AM Jan Lock MD [facility]           Time spent on Discharge including face to face service 33 minutes        Signature:

## 2021-10-06 NOTE — PAYOR COMM NOTE
"    UTILIZATION REVIEW  CRIS KIRK RN   PH:   FAX: [phone]    [facility]  NPI# [id number]  TID # [id number]  ===============================    CLINICAL REVIEW FOR INPATIENT MEDICAL ADMISSION  SUBMITTED ON AVAILITY 10/6/21    RE: [id number]    Verified inpt order 10/6 at 0254  Pt was seen on [facility] 10/3 and had an abscess on right posterior thigh drained. Pt was called today and informed of positive blood culture results, pt was informed to return for additional testing and antibiotics.  Hx: HIV  VS:     Labs:  wbc 3,00,  blood cx from 10/3/21- Staph aureus. mecA/C and MREJ (methicillin resistance gene) detected. Identification by BCID2 PCR.Critical  Wound cx from 10/3/21: Scant growth (1+) Staphylococcus aureus, MRSAAbnormal  Meds: cefepime iv, vanc iv , ivf at 100  ================================           Cellulitis (M-70):  Admission is indicated for 1 or more of the following(1)(2)(3)(4)(5)(6)(7):  Hemodynamic instability  Bacteremia (with a likely causative organism)  Significant immunosuppression (eg, long-term systemic steroids, neutropenia)      Wong Funez (36 y.o. Male)     Date of Birth Social Security Number Address Home Phone N    1985  [address] [place] [address] [phone] [id number]    Christian Marital Status          Zoroastrian Single       Admission Date Admission Type Admitting Provider Attending Provider Department, Room/Bed    10/6/21 Emergency Jan Lock MD [facility]    Discharge Date Discharge Disposition Discharge Destination                       Attending Provider: Jan Lock MD    Allergies: No Known Allergies    Isolation: Contact   Infection: MRSA (10/04/21), COVID (History) (10/06/21)   Code Status: CPR    Ht: 180.3 cm (71\")   Wt: 77.4 kg (170 lb 10.2 oz)    Admission Cmt: None   Principal Problem: None                Active " Insurance as of 10/6/2021     Primary Coverage     Payor Plan Insurance Group Employer/Plan Group    [employer]CIAL A[employer] [id number]     Payor Plan Address Payor Plan Phone Number Payor Plan Fax Number Effective Dates    [address] [phone]  2020 - None Entered    [place] [id number]       Subscriber Name Subscriber Birth Date Member ID       MONSTER WILLIAMSON 1981 [id number]                 Emergency Contacts      (Rel.) Home Phone Work Phone Mobile Phone    TRESSA VIEYRA (Mother) [phone] -- [phone]               History & Physical      Demetrio-Winsome Alicea APRN at 10/06/21 0257              [facility]      Patient Name: Wong Vieyra  : 1985  MRN: [id number]  Primary Care Physician:  Provider, No Known  Date of admission: 10/6/2021      Subjective      Chief Complaint: Positive blood cultures    History of Present Illness: Wong Vieyra is a 36 y.o. male who [facility] after being notified of results of blood and wound cultures which were positive for MRSA.  He was seen here in the emergency department on 2021 with complaints of a right posterior upper thigh wound.  He reports he thought it was an ingrown hair which has become infected.  He was placed on Keflex and Bactrim and then received a phone call to return to the hospital tonight.  Per report, he has a small wound in his right upper posterior thigh and the large area of erythema which extends from the gluteal fold to behind the knee.  He was apparently incised and drained in the emergency department on 10-21 and also some purulent material was expressed from at this evening in the emergency department.  He reports some tenderness to the area and subjective fever and chills and diaphoresis.  He is afebrile here tonight.  He was mildly tachycardic upon admission lactic is 1.2.  He was given 1000 IV fluid  bolus in ED. WBC is 3.0, hemoglobin 11.7.   He was started on IV vancomycin and IV cefepime in the emergency department and will be admitted for further evaluation and treatment.  He has a past medical history of HIV and reports he is on antiretroviral medication.  Review of Systems   Constitutional: Positive for chills and diaphoresis.   HENT: Negative.    Eyes: Negative.    Cardiovascular: Negative.    Respiratory: Negative.    Endocrine: Negative.    Hematologic/Lymphatic: Negative.    Skin: Negative.    Musculoskeletal: Negative.    Gastrointestinal: Negative.    Genitourinary: Negative.    Neurological: Negative.    Psychiatric/Behavioral: Negative.    Allergic/Immunologic: Positive for HIV exposure.        Personal History     Past Medical History:   Diagnosis Date   • HIV (human immunodeficiency virus infection) ([facility])        History reviewed. No pertinent surgical history.    Family History: family history includes No Known Problems in his father and mother. Otherwise pertinent FHx was reviewed and not pertinent to current issue.    Social History:  reports that he has never smoked. He does not have any smokeless tobacco history on file. He reports previous alcohol use. He reports previous drug use.    Home Medications:  Prior to Admission Medications     Prescriptions Last Dose Informant Patient Reported? Taking?    HYDROcodone-acetaminophen (Norco) 7.5-325 MG per tablet   No No    Take 1 tablet by mouth Every 6 (Six) Hours As Needed for Moderate Pain .            Allergies:  No Known Allergies    Objective      Vitals:   Temp:  [97.6 °F (36.4 °C)] 97.6 °F (36.4 °C)  Heart Rate:  [107-136] 110  Resp:  [18] 18  BP: (115-148)/(68-92) 123/75    Physical Exam  Vitals reviewed.   Constitutional:       Appearance: Normal appearance.   HENT:      Head: Normocephalic and atraumatic.      Right Ear: External ear normal.      Left Ear: External ear normal.      Nose: Nose normal.      Mouth/Throat:      Mouth: Mucous  membranes are moist.   Eyes:      Extraocular Movements: Extraocular movements intact.   Cardiovascular:      Rate and Rhythm: Normal rate and regular rhythm.      Pulses: Normal pulses.      Heart sounds: Normal heart sounds.   Pulmonary:      Effort: Pulmonary effort is normal.      Breath sounds: Normal breath sounds.   Abdominal:      Palpations: Abdomen is soft.   Genitourinary:     Comments: deferred  Musculoskeletal:         General: Swelling and tenderness present.      Cervical back: Normal range of motion and neck supple.      Comments: Right posterior lower extremity   Skin:     Findings: Erythema and lesion present.      Comments: 8 posterior upper thigh   Neurological:      General: No focal deficit present.      Mental Status: He is alert and oriented to person, place, and time.   Psychiatric:         Mood and Affect: Mood normal.         Behavior: Behavior normal.         Thought Content: Thought content normal.         Judgment: Judgment normal.          Result Review    Result Review:  I have personally reviewed the results from the time of this admission to 10/6/2021 03:36 EDT and agree with these findings:  [x]  Laboratory  [x]  Microbiology  []  Radiology  []  EKG/Telemetry   []  Cardiology/Vascular   []  Pathology  [x]  Old records  []  Other:  Most notable findings include: WBC 3.0, lactic 1.2, blood and wound culture positive for MRSA      Assessment/Plan        Active Hospital Problems:  Active Hospital Problems    Diagnosis    • Cellulitis and abscess of right lower extremity      Plan:    Cellulitis and abscess of the right lower extremity blood and wound cultures came back positive for MRSA, continue IV vancomycin and IV Zosyn pharmacy to dose, normal saline at 100 cc/h, Norco 5/325 every 4 hours as needed pain, consider general surgery consult for deep incision and drainage if appropriate    HIV, reports on antiretroviral medication home meds unverified at this time reorder pending  verification by pharmacy    Anxiety, 25 mg hydroxyzine every 8 hours as needed anxiety    DVT prophylaxis:  Mechanical DVT prophylaxis orders are present.    CODE STATUS:    Code Status: CPR  Medical Interventions (Level of Support Prior to Arrest): Full    Admission Status:  I believe this patient meets inpatient status.    I discussed the patient's findings and my recommendations with patient.    This patient has been examined wearing appropriate Personal Protective Equipment     Signature: Electronically signed by ARMEN Hunter, 10/06/21, 3:48 AM EDT.    Electronically signed by Winsome Cui APRN at 10/06/21 0350          Emergency Department Notes      Emeli Dunn, RN at 10/06/21 0052        Pt was seen on [facility] 10/3 and had an abscess on right posterior thigh drained. Pt was called today and informed of positive blood culture results, pt was informed to return for additional testing and antibiotics.     Emeli Dunn RN  10/06/21 0055       Emeli Dunn RN  10/06/21 0055      Electronically signed by Emeli Dunn RN at 10/06/21 0055     Adis Webster MD at 10/06/21 0057          Subjective   36-year-old male who was seen here on Sunday for right posterior thigh abscess/cellulitis, underwent incision and drainage.  Patient was discharged on Keflex/Bactrim/Norco.  Patient states his thigh actually feels a little better in terms of pain.  Patient did develop some diaphoresis and subjective fever this afternoon.  Patient had Covid in late September but feels like he is recovered from that the most part.  Patient denies any history of recurrent abscesses or soft tissue infection.  Symptoms are overall moderate, worse with movement.  Patient had wound culture positive for MRSA sensitive to vancomycin and Bactrim.  Patient reports compliance with his antibiotics.  Blood culture positive as well.          Review of Systems   Constitutional: Positive for fever.    Musculoskeletal:        As per HPI   Skin: Positive for wound.   All other systems reviewed and are negative.      No past medical history on file.    No Known Allergies    No past surgical history on file.    No family history on file.    Social History     Socioeconomic History   • Marital status: Single     Spouse name: Not on file   • Number of children: Not on file   • Years of education: Not on file   • Highest education level: Not on file           Objective   Physical Exam  Constitutional:       Appearance: Normal appearance.   HENT:      Head: Normocephalic and atraumatic.      Mouth/Throat:      Mouth: Mucous membranes are moist.      Pharynx: Oropharynx is clear.   Eyes:      Conjunctiva/sclera: Conjunctivae normal.      Pupils: Pupils are equal, round, and reactive to light.   Cardiovascular:      Rate and Rhythm: Tachycardia present.      Pulses: Normal pulses.      Heart sounds: Normal heart sounds.   Pulmonary:      Effort: Pulmonary effort is normal.      Breath sounds: Normal breath sounds.   Abdominal:      General: Bowel sounds are normal. There is no distension.      Palpations: Abdomen is soft.      Tenderness: There is no abdominal tenderness.   Musculoskeletal:      Comments: Right posterior thigh with packed wound with surrounding erythema and some blistering at the cephalad aspect.  Erythema encompasses most of the posterior thigh.  There is approximately a 5 cm halo of induration and tenderness around the wound.  Packing was removed, a mild amount of purulence can be expressed but I am not convinced further incision will assist this gentleman.  Wound was repacked, redness will be outlined with wound pen   Skin:     Capillary Refill: Capillary refill takes less than 2 seconds.   Neurological:      General: No focal deficit present.      Mental Status: He is alert and oriented to person, place, and time.   Psychiatric:         Mood and Affect: Mood normal.         Behavior: Behavior normal.          Procedures          ED Course                                           MDM  Number of Diagnoses or Management Options  Cellulitis and abscess of right lower extremity  Positive blood culture  Diagnosis management comments: Results for orders placed or performed during the hospital encounter of 10/06/21  -Comprehensive Metabolic Panel  Specimen: Blood       Result                      Value             Ref Range           Glucose                     69                65 - 99 mg/dL       BUN                         11                6 - 20 mg/dL        Creatinine                  1.22              0.76 - 1.27 *       Sodium                      136               136 - 145 mm*       Potassium                   3.7               3.5 - 5.2 mm*       Chloride                    98                98 - 107 mmo*       CO2                         24.0              22.0 - 29.0 *       Calcium                     8.5 (L)           8.6 - 10.5 m*       Total Protein               8.6 (H)           6.0 - 8.5 g/*       Albumin                     3.40 (L)          3.50 - 5.20 *       ALT (SGPT)                  14                1 - 41 U/L          AST (SGOT)                  24                1 - 40 U/L          Alkaline Phosphatase        59                39 - 117 U/L        Total Bilirubin             0.3               0.0 - 1.2 mg*       eGFR Non  Amer       68                >60 mL/min/1*       Globulin                    5.2               gm/dL               A/G Ratio                   0.7               g/dL                BUN/Creatinine Ratio        9.0               7.0 - 25.0          Anion Gap                   14.0              5.0 - 15.0 m*  -CBC Auto Differential  Specimen: Blood       Result                      Value             Ref Range           WBC                         3.00 (L)          3.40 - 10.80*       RBC                         4.04 (L)          4.14 - 5.80 *       Hemoglobin                   11.7 (L)          13.0 - 17.7 *       Hematocrit                  34.7 (L)          37.5 - 51.0 %       MCV                         85.8              79.0 - 97.0 *       MCH                         29.0              26.6 - 33.0 *       MCHC                        33.8              31.5 - 35.7 *       RDW                         13.4              12.3 - 15.4 %       RDW-SD                      40.7              37.0 - 54.0 *       MPV                         8.3               6.0 - 12.0 fL       Platelets                   225               140 - 450 10*       Neutrophil %                60.5              42.7 - 76.0 %       Lymphocyte %                20.3              19.6 - 45.3 %       Monocyte %                  15.8 (H)          5.0 - 12.0 %        Eosinophil %                3.0               0.3 - 6.2 %         Basophil %                  0.4               0.0 - 1.5 %         Neutrophils, Absolute       1.80              1.70 - 7.00 *       Lymphocytes, Absolute       0.60 (L)          0.70 - 3.10 *       Monocytes, Absolute         0.50              0.10 - 0.90 *       Eosinophils, Absolute       0.10              0.00 - 0.40 *       Basophils, Absolute         0.00              0.00 - 0.20 *       nRBC                        0.1               0.0 - 0.2 /1*  -POC Lactate  Specimen: Blood       Result                      Value             Ref Range           Lactate                     1.2               0.5 - 2.0 mm*  -Green Top (Gel)       Result                      Value             Ref Range           Extra Tube                  hold                             -Lavender Top       Result                      Value             Ref Range           Extra Tube                                                    hold for add-on  -Gold Top - SST       Result                      Value             Ref Range           Extra Tube                                                    Hold for add-ons.  -Light  Blue Top       Result                      Value             Ref Range           Extra Tube                                                    hold for add-on    Patient hemodynamically stable with 1/2+ blood cultures, will observe for IV antibiotics       Amount and/or Complexity of Data Reviewed  Clinical lab tests: ordered and reviewed  Tests in the medicine section of CPT®: ordered and reviewed  Decide to obtain previous medical records or to obtain history from someone other than the patient: yes        Final diagnoses:   Cellulitis and abscess of right lower extremity   Positive blood culture       ED Disposition  ED Disposition     ED Disposition Condition Comment    Decision to Admit  Level of Care: Med/Surg [1]   Admitting Physician: CHITRA BUSH [[id number]]            No follow-up provider specified.       Medication List      No changes were made to your prescriptions during this visit.          Adis Webster MD  10/06/21 0236      Electronically signed by Adis Webster MD at 10/06/21 0236       Vital Signs (last day)     Date/Time   Temp   Temp src   Pulse   Resp   BP   Patient Position   SpO2    10/06/21 0424   98.8 (37.1)   Oral   93   18   127/78   Lying   100    10/06/21 0201   --   --   110   --   123/75   --   98    10/06/21 0146   --   --   107   --   119/76   --   95    10/06/21 0139   --   --   112   --   115/68   --   100    10/06/21 0048   --   --   110   --   --   --   100    10/05/21 1948   97.6 (36.4)   --   (!) 136   18   148/92   Sitting   95

## 2021-10-06 NOTE — ED NOTES
Pt was seen on [facility] 10/3 and had an abscess on right posterior thigh drained. Pt was called today and informed of positive blood culture results, pt was informed to return for additional testing and antibiotics.     Emeli Dunn, RN  10/06/21 0055       Emeli Dunn RN  10/06/21 0055

## 2021-10-06 NOTE — CONSULTS
Inpatient General Surgery Consult  Consult performed by: Dhaval Reyes MD  Consult ordered by: Jan Lock MD  Reason for consult: posterior thigh wound          General Surgery Consult Note      Name: Wong Funez ADMIT: 10/6/2021   : 1985  PCP: Monty, No Known    MRN: [id number] LOS: 0 days   AGE/SEX: 36 y.o. male  ROOM: [address] [place] [address]      Patient Care Team:  Provider, No Known as PCP - General  Chief Complaint   Patient presents with   • Abnormal Lab       Subjective   36-year-old gentleman with a history of HIV who says his counts are undetectable of recent who has about a 1 week history of a worsening posterior right thigh wound.  He says this started out as a small red raised lesion gradually progressed through the week last week when it became much more tender to palpation he sought medical attention.  He went to urgent care who put him on antibiotics and then went to the emergency department where he had incision and drainage of the posterior thigh abscess.  He was discharged home and recalled for bacteremia.  Yesterday he says that he was having chills and sweats which were a new symptom but he was at work when this was occurring.  The posterior thigh wound was significantly tender it felt like a deep discomfort and had a lot of surrounding erythema.  That erythema has resolved since the initiation of antibiotics.  Wound care is seen him and is recommending Dakin's packing.  I have been also asked to see him to consider any further surgical debridement.    Past Medical History:   Diagnosis Date   • ADHD 2019   • Anxiety 2019   • Bipolar 1 disorder (HCC) 2019   • Depression 2019   • HIV (human immunodeficiency virus infection) (Formerly [facility])      History reviewed. No pertinent surgical history.  Family History   Problem Relation Age of Onset   • No Known Problems Mother    • No Known Problems Father      Social History     Tobacco Use   • Smoking status: Never Smoker   Vaping Use   •  Vaping Use: Never used   Substance Use Topics   • Alcohol use: Not Currently   • Drug use: Not Currently     Medications Prior to Admission   Medication Sig Dispense Refill Last Dose   • amphetamine-dextroamphetamine (ADDERALL) 30 MG tablet Take 30 mg by mouth 2 (two) times a day.      • busPIRone (BUSPAR) 30 MG tablet Take 30 mg by mouth 2 (two) times a day.      • cephalexin (KEFLEX) 500 MG capsule Take 500 mg by mouth 2 (two) times a day. Start date 10/2 end date 10/12      • Darunavir-Cobicistat (Prezcobix) 800-150 MG per tablet Take 1 tablet by mouth Daily.      • Emtricitabine-Tenofovir AF (Descovy) 200-25 MG per tablet Take 1 tablet by mouth Daily.      • mupirocin (BACTROBAN) 2 % ointment Apply 1 application topically to the appropriate area as directed 3 (Three) Times a Day. Right upper thigh      • sulfamethoxazole-trimethoprim (BACTRIM DS,SEPTRA DS) 800-160 MG per tablet Take 1 tablet by mouth 2 (two) times a day.      • vitamin D (ERGOCALCIFEROL) 1.25 MG (23049 UT) capsule capsule Take 50,000 Units by mouth 1 (One) Time Per Week.      • HYDROcodone-acetaminophen (Norco) 7.5-325 MG per tablet Take 1 tablet by mouth Every 6 (Six) Hours As Needed for Moderate Pain . 15 tablet 0      busPIRone, 30 mg, Oral, Q12H  cefepime, 2 g, Intravenous, Q8H  Darunavir-Cobicistat, 1 tablet, Oral, Daily  Emtricitabine-Tenofovir AF, 1 tablet, Oral, Daily  sodium chloride, 3 mL, Intravenous, Q12H  sodium hypochlorite, , Topical, BID  vancomycin, 1,000 mg, Intravenous, Q12H  [START ON 10/7/2021] vitamin D, 50,000 Units, Oral, Weekly      [START ON 10/7/2021] Pharmacy to dose vancomycin,   sodium chloride, 100 mL/hr, Last Rate: 100 mL/hr (10/06/21 0334)      •  acetaminophen **OR** acetaminophen **OR** acetaminophen  •  HYDROcodone-acetaminophen  •  hydrOXYzine  •  melatonin  •  ondansetron **OR** ondansetron  •  [START ON 10/7/2021] Pharmacy to dose vancomycin  •  sodium chloride  •  sodium chloride  Patient has no known  allergies.    Review of Systems   Constitutional: Positive for chills. Negative for fever.   HENT: Negative for sore throat and trouble swallowing.    Eyes: Negative for blurred vision and double vision.   Respiratory: Negative for cough and shortness of breath.    Cardiovascular: Positive for leg swelling. Negative for chest pain.   Gastrointestinal: Negative for blood in stool, constipation, diarrhea, nausea and vomiting.   Genitourinary: Negative for dysuria and hematuria.   Skin: Negative for rash and bruise.        Objective     Vital Signs and Labs:  Vital Signs (range)  Temp:  [97.6 °F (36.4 °C)-98.8 °F (37.1 °C)] 98.1 °F (36.7 °C)  Heart Rate:  [] 92  Resp:  [18] 18  BP: (115-148)/(61-92) 123/61    Physical Exam:  Physical Exam  Constitutional:       Appearance: Normal appearance. He is well-developed.   HENT:      Head: Normocephalic and atraumatic.   Eyes:      General: No scleral icterus.     Conjunctiva/sclera: Conjunctivae normal.   Neck:      Trachea: No tracheal deviation.   Cardiovascular:      Rate and Rhythm: Normal rate.      Pulses: Normal pulses.   Pulmonary:      Effort: Pulmonary effort is normal. No respiratory distress.   Musculoskeletal:         General: No swelling or tenderness.      Cervical back: Neck supple. No tenderness. No muscular tenderness.      Comments: In the posterior right thigh there is an approximately 1 cm open wound which is packed without significant discharge.  There is some deep induration but the erythema of the posterior thigh has largely resolved.  He has only mildly tender to palpation this location.   Skin:     General: Skin is warm and dry.   Neurological:      General: No focal deficit present.      Mental Status: He is alert. Mental status is at baseline.   Psychiatric:         Mood and Affect: Mood normal.         Behavior: Behavior normal.         CBC    Results from last 7 days   Lab Units 10/06/21  0059 10/03/21  2015   WBC 10*3/mm3 3.00* 5.80    HEMOGLOBIN g/dL 11.7* 12.6*   PLATELETS 10*3/mm3 225 230     BMP   Results from last 7 days   Lab Units 10/06/21  0059 10/03/21  2015   SODIUM mmol/L 136 131*   POTASSIUM mmol/L 3.7 3.7   CHLORIDE mmol/L 98 95*   CO2 mmol/L 24.0 23.0   BUN mg/dL 11 10   CREATININE mg/dL 1.22 0.99   GLUCOSE mg/dL 69 73     Radiology(recent) No radiology results for the last day    I reviewed the patient's new clinical results.    Assessment/Plan       Cellulitis and abscess of right lower extremity      36 y.o. male with a posterior thigh abscess has been incised and drained.    I believe this has been open enough to allow for ongoing healing.  I have counseled him about packing.  I think that Dakin's packing for now is appropriate.  Can switch to normal saline within a few days.  Okay to wash and dry with soap and water.  Okay to follow-up in the office for a wound check.  From my standpoint his limit for discharge would be the bacteremia antibiotic selection.    For now no additional surgery is needed.      This note was created using Dragon Voice Recognition software.    Dhaval Reyes MD  10/06/21  12:32 EDT

## 2021-10-06 NOTE — PROGRESS NOTES
"Pharmacy Antimicrobial Dosing Service    Subjective:  Wong Funez is a 36 y.o.male admitted with cellulitis. Pharmacy has been consulted to dose Vancomycin for possible SSTI.    PMH: cellulitis from ED visit 10/3, cultures drawn, d/c on bactrim and keflex, wound and blood cultures positive for MRSA so called to come back in.      Assessment/Plan    1. Day #1 Vancomycin: Goal -600 mcg*h/mL. Vanc 1750mg IV (~23 mg/kg ABW) given in ED. Will schedule vancomycin 1g (~13 mg/kg ABW) q12h. Will obtain level prior to 3rd dose on 10/7.    Will continue to monitor drug levels, renal function, culture and sensitivities, and patient clinical status.       Objective:  Relevant clinical data and objective history reviewed:  180.3 cm (71\")   77.4 kg (170 lb 10.2 oz)   Ideal body weight: 75.3 kg (166 lb 0.1 oz)  Adjusted ideal body weight: 76.1 kg (167 lb 13.7 oz)  Body mass index is 23.8 kg/m².        Results from last 7 days   Lab Units 10/06/21  0059 10/03/21  2015   CREATININE mg/dL 1.22 0.99     Estimated Creatinine Clearance: 93.4 mL/min (by C-G formula based on SCr of 1.22 mg/dL).  No intake/output data recorded.    Results from last 7 days   Lab Units 10/06/21  0059 10/03/21  2015   WBC 10*3/mm3 3.00* 5.80     Temperature    10/05/21 1948 10/06/21 0424   Temp: 97.6 °F (36.4 °C) 98.8 °F (37.1 °C)     Baseline culture/source/susceptibility:  Microbiology Results (last 10 days)       Procedure Component Value - Date/Time    Blood Culture - Blood, Arm, Left [[id number]] Collected: 10/03/21 2047    Lab Status: Preliminary result Specimen: Blood from Arm, Left Updated: 10/04/21 2100     Blood Culture No growth at 24 hours    Blood Culture ID, PCR - Blood, Arm, Left [[id number]]  (Abnormal) Collected: 10/03/21 2047    Lab Status: Final result Specimen: Blood from Arm, Left Updated: 10/05/21 0957     BCID, PCR Staph aureus. mecA/C and MREJ (methicillin resistance gene) detected. Identification by BCID2 PCR.     BOTTLE TYPE " Anaerobic Bottle    Narrative:      Infectious disease consultation is highly recommended to rule out distant foci of infection.    Blood Culture - Blood, Arm, Left [[id number]] Collected: 10/03/21 2015    Lab Status: Preliminary result Specimen: Blood from Arm, Left Updated: 10/05/21 2030     Blood Culture No growth at 2 days    Wound Culture - Drainage, Leg, Right [[id number]]  (Abnormal)  (Susceptibility) Collected: 10/03/21 2015    Lab Status: Final result Specimen: Drainage from Leg, Right Updated: 10/05/21 1059     Wound Culture Scant growth (1+) Staphylococcus aureus, MRSA     Comment: Methicillin resistant Staphylococcus aureus, Patient may be an isolation risk.        Gram Stain Rare (1+) WBCs per low power field      Rare (1+) Gram positive cocci in pairs    Susceptibility        Staphylococcus aureus, MRSA      JORGE L      Clindamycin Susceptible      Erythromycin Resistant      Inducible Clindamycin Resistance Negative      Oxacillin Resistant      Penicillin G Resistant      Rifampin Susceptible      Tetracycline Susceptible      Trimethoprim + Sulfamethoxazole Susceptible      Vancomycin Susceptible                 Linear View                   Susceptibility Comments       Staphylococcus aureus, MRSA    This isolate does not demonstrate inducible clindamycin resistance in vitro.                         Anti-Infectives (From admission, onward)      Ordered     Dose/Rate Route Frequency Start Stop    10/06/21 0346  Pharmacy to Dose Zosyn     Ordering Provider: Winsome Cui APRN     Does not apply Continuous PRN 10/07/21 0000 10/10/21 2359    10/06/21 0346  Pharmacy to dose vancomycin     Ordering Provider: Winsome Cui APRN     Does not apply Continuous PRN 10/07/21 0000 10/10/21 2359    10/06/21 0618  vancomycin (VANCOCIN) 1,000 mg in sodium chloride 0.9 % 250 mL IVPB     Ordering Provider: Winsome Cui APRN    1,000 mg  over 60 Minutes Intravenous Every 12 Hours 10/06/21 1300  10/10/21 1259    10/06/21 0057  piperacillin-tazobactam (ZOSYN) IVPB 3.375 g in 100 mL NS (CD)     Ordering Provider: Adis Webster MD    3.375 g  over 30 Minutes Intravenous Once 10/06/21 0059 10/06/21 0211    10/06/21 0057  vancomycin IVPB 1750 mg in 0.9% Sodium Chloride (premix) 500 mL     Ordering Provider: Adis Webster MD    20 mg/kg × 92.1 kg  over 105 Minutes Intravenous Once 10/06/21 0059 10/06/21 0357            Iliana Chandra RPH  10/06/21 06:19 EDT

## 2021-10-07 ENCOUNTER — READMISSION MANAGEMENT (OUTPATIENT)
Dept: CALL CENTER | Facility: HOSPITAL | Age: 36
End: 2021-10-07

## 2021-10-07 ENCOUNTER — TRANSITIONAL CARE MANAGEMENT TELEPHONE ENCOUNTER (OUTPATIENT)
Dept: CALL CENTER | Facility: HOSPITAL | Age: 36
End: 2021-10-07

## 2021-10-07 LAB
BH CV ECHO MEAS - ACS: 2.4 CM
BH CV ECHO MEAS - AO MAX PG (FULL): 6.4 MMHG
BH CV ECHO MEAS - AO MAX PG: 11.7 MMHG
BH CV ECHO MEAS - AO MEAN PG (FULL): 2.1 MMHG
BH CV ECHO MEAS - AO MEAN PG: 5 MMHG
BH CV ECHO MEAS - AO ROOT AREA (BSA CORRECTED): 1.6
BH CV ECHO MEAS - AO ROOT AREA: 8.2 CM^2
BH CV ECHO MEAS - AO ROOT DIAM: 3.2 CM
BH CV ECHO MEAS - AO V2 MAX: 171.2 CM/SEC
BH CV ECHO MEAS - AO V2 MEAN: 100.7 CM/SEC
BH CV ECHO MEAS - AO V2 VTI: 31.9 CM
BH CV ECHO MEAS - AORTIC HR: 86.1 BPM
BH CV ECHO MEAS - AORTIC R-R: 0.7 SEC
BH CV ECHO MEAS - ASC AORTA: 2.8 CM
BH CV ECHO MEAS - AVA(I,A): 3.5 CM^2
BH CV ECHO MEAS - AVA(I,D): 3.5 CM^2
BH CV ECHO MEAS - AVA(V,A): 3.2 CM^2
BH CV ECHO MEAS - AVA(V,D): 3.2 CM^2
BH CV ECHO MEAS - BSA(HAYCOCK): 2 M^2
BH CV ECHO MEAS - BSA: 2 M^2
BH CV ECHO MEAS - BZI_BMI: 23.7 KILOGRAMS/M^2
BH CV ECHO MEAS - BZI_METRIC_HEIGHT: 180.3 CM
BH CV ECHO MEAS - BZI_METRIC_WEIGHT: 77.1 KG
BH CV ECHO MEAS - CI(AO): 11.4 L/MIN/M^2
BH CV ECHO MEAS - CI(LVOT): 4.9 L/MIN/M^2
BH CV ECHO MEAS - CO(AO): 22.5 L/MIN
BH CV ECHO MEAS - CO(LVOT): 9.6 L/MIN
BH CV ECHO MEAS - EDV(CUBED): 62.1 ML
BH CV ECHO MEAS - EDV(MOD-SP2): 120.1 ML
BH CV ECHO MEAS - EDV(MOD-SP4): 96.3 ML
BH CV ECHO MEAS - EDV(TEICH): 68.4 ML
BH CV ECHO MEAS - EF(CUBED): 68.9 %
BH CV ECHO MEAS - EF(MOD-BP): 65 %
BH CV ECHO MEAS - EF(MOD-SP2): 66.7 %
BH CV ECHO MEAS - EF(MOD-SP4): 63.2 %
BH CV ECHO MEAS - EF(TEICH): 61.1 %
BH CV ECHO MEAS - ESV(CUBED): 19.3 ML
BH CV ECHO MEAS - ESV(MOD-SP2): 40 ML
BH CV ECHO MEAS - ESV(MOD-SP4): 35.5 ML
BH CV ECHO MEAS - ESV(TEICH): 26.6 ML
BH CV ECHO MEAS - FS: 32.3 %
BH CV ECHO MEAS - IVS/LVPW: 0.87
BH CV ECHO MEAS - IVSD: 1.2 CM
BH CV ECHO MEAS - LA DIMENSION(2D): 3.5 CM
BH CV ECHO MEAS - LA DIMENSION: 3.6 CM
BH CV ECHO MEAS - LA/AO: 1.1
BH CV ECHO MEAS - LV DIASTOLIC VOL/BSA (35-75): 48.9 ML/M^2
BH CV ECHO MEAS - LV MASS(C)D: 176.2 GRAMS
BH CV ECHO MEAS - LV MASS(C)DI: 89.5 GRAMS/M^2
BH CV ECHO MEAS - LV MAX PG: 5.3 MMHG
BH CV ECHO MEAS - LV MEAN PG: 2.9 MMHG
BH CV ECHO MEAS - LV SYSTOLIC VOL/BSA (12-30): 18 ML/M^2
BH CV ECHO MEAS - LV V1 MAX: 115.1 CM/SEC
BH CV ECHO MEAS - LV V1 MEAN: 79.3 CM/SEC
BH CV ECHO MEAS - LV V1 VTI: 23.5 CM
BH CV ECHO MEAS - LVIDD: 4 CM
BH CV ECHO MEAS - LVIDS: 2.7 CM
BH CV ECHO MEAS - LVOT AREA: 4.7 CM^2
BH CV ECHO MEAS - LVOT DIAM: 2.5 CM
BH CV ECHO MEAS - LVPWD: 1.3 CM
BH CV ECHO MEAS - MV A MAX VEL: 69.2 CM/SEC
BH CV ECHO MEAS - MV DEC SLOPE: 520.9 CM/SEC^2
BH CV ECHO MEAS - MV DEC TIME: 0.18 SEC
BH CV ECHO MEAS - MV E MAX VEL: 92.5 CM/SEC
BH CV ECHO MEAS - MV E/A: 1.3
BH CV ECHO MEAS - MV MAX PG: 6.1 MMHG
BH CV ECHO MEAS - MV MEAN PG: 2.6 MMHG
BH CV ECHO MEAS - MV V2 MAX: 123.9 CM/SEC
BH CV ECHO MEAS - MV V2 MEAN: 73.6 CM/SEC
BH CV ECHO MEAS - MV V2 VTI: 21.6 CM
BH CV ECHO MEAS - MVA(VTI): 5.1 CM^2
BH CV ECHO MEAS - PA ACC TIME: 0.1 SEC
BH CV ECHO MEAS - PA MAX PG (FULL): 3.7 MMHG
BH CV ECHO MEAS - PA MAX PG: 5.4 MMHG
BH CV ECHO MEAS - PA MEAN PG (FULL): 1.5 MMHG
BH CV ECHO MEAS - PA MEAN PG: 2.5 MMHG
BH CV ECHO MEAS - PA PR(ACCEL): 34 MMHG
BH CV ECHO MEAS - PA V2 MAX: 116 CM/SEC
BH CV ECHO MEAS - PA V2 MEAN: 71.7 CM/SEC
BH CV ECHO MEAS - PA V2 VTI: 22.7 CM
BH CV ECHO MEAS - PULM SYS VEL: 78.5 CM/SEC
BH CV ECHO MEAS - RAP SYSTOLE: 8 MMHG
BH CV ECHO MEAS - RV MAX PG: 1.7 MMHG
BH CV ECHO MEAS - RV MEAN PG: 1 MMHG
BH CV ECHO MEAS - RV V1 MAX: 64.5 CM/SEC
BH CV ECHO MEAS - RV V1 MEAN: 48.8 CM/SEC
BH CV ECHO MEAS - RV V1 VTI: 14.5 CM
BH CV ECHO MEAS - RVDD: 3 CM
BH CV ECHO MEAS - RVSP: 32.3 MMHG
BH CV ECHO MEAS - SI(AO): 132.8 ML/M^2
BH CV ECHO MEAS - SI(CUBED): 21.8 ML/M^2
BH CV ECHO MEAS - SI(LVOT): 56.5 ML/M^2
BH CV ECHO MEAS - SI(MOD-SP2): 40.7 ML/M^2
BH CV ECHO MEAS - SI(MOD-SP4): 30.9 ML/M^2
BH CV ECHO MEAS - SI(TEICH): 21.2 ML/M^2
BH CV ECHO MEAS - SV(AO): 261.4 ML
BH CV ECHO MEAS - SV(CUBED): 42.8 ML
BH CV ECHO MEAS - SV(LVOT): 111.2 ML
BH CV ECHO MEAS - SV(MOD-SP2): 80 ML
BH CV ECHO MEAS - SV(MOD-SP4): 60.8 ML
BH CV ECHO MEAS - SV(TEICH): 41.8 ML
BH CV ECHO MEAS - TR MAX VEL: 246.1 CM/SEC

## 2021-10-07 NOTE — OUTREACH NOTE
Call Center TCM Note      Responses   [facility] patient discharged from?  [facility]   Does the patient have one of the following disease processes/diagnoses(primary or secondary)?  General Surgery   TCM attempt successful?  No   Unsuccessful attempts  Attempt 2   Call Status  Left message          Edith Reyna RN    10/7/2021, 16:53 EDT

## 2021-10-07 NOTE — OUTREACH NOTE
Call Center TCM Note      Responses   [facility] patient discharged from?  [facility]   Does the patient have one of the following disease processes/diagnoses(primary or secondary)?  General Surgery   TCM attempt successful?  No   Unsuccessful attempts  Attempt 1   Comments  [facility] fu appt on 10/14/21 at 11:00 AM          Edith Reyna RN    10/7/2021, 16:22 EDT

## 2021-10-07 NOTE — NURSING NOTE
Medications left in patient specific drawer and not sent home with patient.  Notified by pharmacy that meds were here.  Call placed to [phone] per patient facesheet and voicemail left.

## 2021-10-07 NOTE — CASE MANAGEMENT/SOCIAL WORK
Case Management Discharge Note      Final Note: Home prior to CM assessment         Selected Continued Care - Discharged on 10/6/2021 Admission date: 10/6/2021 - Discharge disposition: Home or Self Care        Final Discharge Disposition Code: 01 - home or self-care

## 2021-10-07 NOTE — OUTREACH NOTE
Prep Survey      Responses   [facility] patient discharged from?  [facility]   Is LACE score < 7 ?  Yes   Emergency Room discharge w/ pulse ox?  No   Eligibility  [facility]   Date of Admission  10/06/21   Date of Discharge  10/06/21   Discharge Disposition  Home or Self Care   Discharge diagnosis  Cellulitis and abscess of right lower extremity, s/p I&D   Does the patient have one of the following disease processes/diagnoses(primary or secondary)?  General Surgery   Does the patient have Home health ordered?  No   Is there a DME ordered?  No   Comments regarding appointments  local wound care with outpatient follow-up   Prep survey completed?  Yes          Cristine iL RN

## 2021-10-08 ENCOUNTER — TRANSITIONAL CARE MANAGEMENT TELEPHONE ENCOUNTER (OUTPATIENT)
Dept: CALL CENTER | Facility: HOSPITAL | Age: 36
End: 2021-10-08

## 2021-10-08 LAB — BACTERIA SPEC AEROBE CULT: NORMAL

## 2021-10-08 NOTE — OUTREACH NOTE
Call Center TCM Note      Responses   [facility] patient discharged from?  [facility]   Does the patient have one of the following disease processes/diagnoses(primary or secondary)?  General Surgery   TCM attempt successful?  No   Unsuccessful attempts  Attempt 3          Yamileth Winters RN    10/8/2021, 12:05 EDT

## 2021-10-11 LAB
BACTERIA SPEC AEROBE CULT: NORMAL
BACTERIA SPEC AEROBE CULT: NORMAL

## 2021-10-14 ENCOUNTER — OFFICE VISIT (OUTPATIENT)
Dept: FAMILY MEDICINE CLINIC | Facility: CLINIC | Age: 36
End: 2021-10-14

## 2021-10-14 VITALS
HEART RATE: 114 BPM | OXYGEN SATURATION: 98 % | BODY MASS INDEX: 27.86 KG/M2 | RESPIRATION RATE: 16 BRPM | DIASTOLIC BLOOD PRESSURE: 80 MMHG | SYSTOLIC BLOOD PRESSURE: 124 MMHG | HEIGHT: 71 IN | WEIGHT: 199 LBS | TEMPERATURE: 96.8 F

## 2021-10-14 DIAGNOSIS — R78.81 MRSA BACTEREMIA: ICD-10-CM

## 2021-10-14 DIAGNOSIS — L02.415 CELLULITIS AND ABSCESS OF RIGHT LOWER EXTREMITY: Primary | ICD-10-CM

## 2021-10-14 DIAGNOSIS — B95.62 MRSA BACTEREMIA: ICD-10-CM

## 2021-10-14 DIAGNOSIS — L03.115 CELLULITIS AND ABSCESS OF RIGHT LOWER EXTREMITY: Primary | ICD-10-CM

## 2021-10-14 PROCEDURE — 99496 TRANSJ CARE MGMT HIGH F2F 7D: CPT | Performed by: HOSPITALIST

## 2021-10-14 RX ORDER — ARIPIPRAZOLE 10 MG/1
10 TABLET ORAL DAILY
COMMUNITY

## 2021-10-14 RX ORDER — FLUOXETINE HYDROCHLORIDE 20 MG/1
20 CAPSULE ORAL 3 TIMES DAILY
COMMUNITY

## 2021-10-14 RX ORDER — TADALAFIL 20 MG/1
TABLET ORAL DAILY PRN
COMMUNITY
Start: 2021-07-16

## 2021-10-14 NOTE — PROGRESS NOTES
Transitional Care Follow Up Visit  Subjective     Wong Funez is a 36 y.o. male who presents for a transitional care management visit.    Within 48 business hours after discharge our office contacted him via telephone to coordinate his care and needs.      I reviewed and discussed the details of that call along with the discharge summary, hospital problems, inpatient lab results, inpatient diagnostic studies, and consultation reports with Wong.     Current outpatient and discharge medications have been reconciled for the patient.  Reviewed by: Jan Lock MD      Date of TCM Phone Call 10/7/2021   [facility]   Date of Admission 10/6/2021   Date of Discharge 10/6/2021   Discharge Disposition Home or Self Care     Risk for Readmission (LACE) Score: 5 (10/6/2021  6:00 AM)      History of Present Illness   Course During Hospital Stay:  Patient known to me from hospital. Pt records reviewed. Pt has very small wound and healing nicely.      The following portions of the patient's history were reviewed and updated as appropriate: allergies, current medications, past family history, past medical history, past social history, past surgical history and problem list.    Review of Systems    Objective   Physical Exam    Assessment/Plan   Diagnoses and all orders for this visit:    1. Cellulitis and abscess of right lower extremity (Primary)  Comments:  very small wound almost healed, pt will be finishing the course next week.     2. MRSA bacteremia      Pt will be following in four for annual physical exam.

## 2021-10-15 ENCOUNTER — TELEPHONE (OUTPATIENT)
Dept: FAMILY MEDICINE CLINIC | Facility: CLINIC | Age: 36
End: 2021-10-15

## 2021-10-15 NOTE — TELEPHONE ENCOUNTER
Caller: Wong Funez    Relationship: Self    Best call back number: [phone]    What form or medical record are you requesting: RETURN TO WORK NOTE    Who is requesting this form or medical record from you: PATIENT'S EMPLOYER    How would you like to receive the form or medical records (pick-up, mail, fax): UPLOAD TO [email or web address] IF POSSIBLE     Timeframe paperwork needed: ASAP    Additional notes:     HIS EMPLOYER ALSO SAID HE NEEDED CLEARANCE TO RETURN TO WORK FROM DR. FREDERICK, HE WOULD LIKE THIS TO BE FILLED OUT AND UPLOADED TO [email or web address] IF POSSIBLE

## 2021-10-15 NOTE — PAYOR COMM NOTE
"ATTENTION MICHELLE RN:        Wong Vieyra (36 y.o. Male) 1985  PENDING AUTH # [id number]            PATIENT WAS ADMITTED AND DISCHARGED ON 10/06/2021 IN INPATIENT STATUS. PLEASE LET ME KNOW IF THERE IS ANYTHING ELSE I CAN ASSIST YOU WITH. THANKS          AUTHORIZATION PENDING:   PLEASE CALL OR FAX DETERMINATION TO CONTACT BELOW. THANK YOU.        Ana M Faria RN MSN  /UR  [facility]  [phone] office  [phone] fax  [email or web address]    [facility]  NPI: [id number]  Tax: [phone]          Wong Vieyra (36 y.o. Male)             Date of Birth Social Security Number Address Home Phone MRN    1985  [address] [address]  [id number]    Congregational Marital Status             Sh[place] Single       Admission Date Admission Type Admitting Provider Attending Provider Department, Room/Bed    10/6/21 Emergency Jan Lock MD  [facility] 2C MEDICAL INPATIENT, 245/1    Discharge Date Discharge Disposition Discharge Destination          10/6/2021 Home or Self Care              Attending Provider: (none)   Allergies: No Known Allergies    Isolation: None   Infection: MRSA (10/04/21), COVID (History) (10/06/21)   Code Status: Prior   Advance Care Planning Activity    Ht: 180.3 cm (71\")   Wt: 77.1 kg (170 lb)    Admission Cmt: None   Principal Problem: None                Active Insurance as of 10/6/2021     Primary Coverage     Payor Plan Insurance Group Employer/Plan Group    [employer] A[employer] [id number]     Payor Plan Address Payor Plan Phone Number Payor Plan Fax Number Effective Dates    [address] [phone]  1/1/2020 - None Entered    [place] [id number]       Subscriber Name Subscriber Birth Date Member ID       MONSTER WILLIAMSON 9/1/1983 [id number]                 Emergency Contacts      (Rel.) Home Phone Work Phone Mobile Phone    ANA M VIEYRA (Mother) -- " [phone]               Discharge Summary      Jan Lock MD at 10/06/21 1425                       [facility] Medicine Services  DISCHARGE SUMMARY    Patient Name: Wong Funez  : 1985  MRN: [id number]    Date of Admission: 10/6/2021  Date of Discharge:  10/6/2021  Primary Care Physician: Provider, No Known      Presenting Problem:   Positive blood culture [R78.81]  Cellulitis and abscess of right lower extremity [L03.115, L02.415]    Active and Resolved Hospital Problems:  Active Hospital Problems    Diagnosis POA   • Cellulitis and abscess of right lower extremity [L03.115, L02.415] Yes   • MRSA bacteremia [R78.81, B95.62] Unknown      Resolved Hospital Problems   No resolved problems to display.         Hospital Course     Hospital Course:    Wong Funez is a 36 y.o. male admitted for abscess involving the thigh region posteriorly, patient underwent incision and drainage, wound culture revealed MRSA, blood cultures also revealed MRSA.  Patient was getting treated with Bactrim outpatient, changed to oral doxycycline on discharge in total for 2 weeks antibiotic therapy.  Surgical services saw the patient, they advised local wound care with outpatient follow-up.  Patient afebrile, WBC count within normal limits.  Patient agreeable to discharge.    Condition on discharge stable    Spent 33 minutes in arranging for the discharge        DISCHARGE Follow Up Recommendations with PCP in a week time  Follow-up with wound care clinic/surgical care clinic in a week time    Reasons For Change In Medications and Indications for New Medications:      Day of Discharge     Vital Signs:  Temp:  [97.6 °F (36.4 °C)-98.8 °F (37.1 °C)] 98.1 °F (36.7 °C)  Heart Rate:  [] 92  Resp:  [18] 18  BP: (115-148)/(61-92) 123/61    Physical Exam:  Physical Exam  Vitals and nursing note reviewed.   Constitutional:       General: He is not in acute distress.     Appearance:  Normal appearance. He is well-developed. He is not ill-appearing, toxic-appearing or diaphoretic.   HENT:      Head: Normocephalic and atraumatic.      Right Ear: Ear canal and external ear normal.      Left Ear: Ear canal and external ear normal.      Nose: Nose normal. No congestion or rhinorrhea.      Mouth/Throat:      Mouth: Mucous membranes are moist.      Pharynx: No oropharyngeal exudate.   Eyes:      General: No scleral icterus.        Right eye: No discharge.         Left eye: No discharge.      Extraocular Movements: Extraocular movements intact.      Conjunctiva/sclera: Conjunctivae normal.      Pupils: Pupils are equal, round, and reactive to light.   Neck:      Thyroid: No thyromegaly.      Vascular: No carotid bruit or JVD.      Trachea: No tracheal deviation.   Cardiovascular:      Rate and Rhythm: Normal rate and regular rhythm.      Pulses: Normal pulses.      Heart sounds: Normal heart sounds. No murmur heard.   No friction rub. No gallop.    Pulmonary:      Effort: Pulmonary effort is normal. No respiratory distress.      Breath sounds: Normal breath sounds. No stridor. No wheezing, rhonchi or rales.   Chest:      Chest wall: No tenderness.   Abdominal:      General: Bowel sounds are normal. There is no distension.      Palpations: Abdomen is soft. There is no mass.      Tenderness: There is no abdominal tenderness. There is no guarding or rebound.      Hernia: No hernia is present.   Musculoskeletal:         General: No swelling, tenderness, deformity or signs of injury. Normal range of motion.      Cervical back: Normal range of motion and neck supple. No rigidity. No muscular tenderness.      Right lower leg: No edema.      Left lower leg: No edema.   Lymphadenopathy:      Cervical: No cervical adenopathy.   Skin:     General: Skin is warm and dry.      Coloration: Skin is not jaundiced or pale.      Findings: No bruising, erythema or rash.   Neurological:      General: No focal deficit  present.      Mental Status: He is alert and oriented to person, place, and time. Mental status is at baseline.      Cranial Nerves: No cranial nerve deficit.      Sensory: No sensory deficit.      Motor: No weakness or abnormal muscle tone.      Coordination: Coordination normal.   Psychiatric:         Mood and Affect: Mood normal.         Behavior: Behavior normal.         Thought Content: Thought content normal.         Judgment: Judgment normal.              Pertinent  and/or Most Recent Results     LAB RESULTS:      Lab 10/06/21  0917 10/06/21  0103 10/06/21  0059 10/03/21  2026 10/03/21  2015   WBC  --   --  3.00*  --  5.80   HEMOGLOBIN  --   --  11.7*  --  12.6*   HEMATOCRIT  --   --  34.7*  --  37.2*   PLATELETS  --   --  225  --  230   NEUTROS ABS  --   --  1.80  --  3.70   LYMPHS ABS  --   --  0.60*  --  1.20   MONOS ABS  --   --  0.50  --  0.80   EOS ABS  --   --  0.10  --  0.00   MCV  --   --  85.8  --  85.1   LACTATE 0.5 1.2  --  1.2  --          Lab 10/06/21  0059 10/03/21  2015   SODIUM 136 131*   POTASSIUM 3.7 3.7   CHLORIDE 98 95*   CO2 24.0 23.0   ANION GAP 14.0 13.0   BUN 11 10   CREATININE 1.22 0.99   GLUCOSE 69 73   CALCIUM 8.5* 8.4*         Lab 10/06/21  0059 10/03/21  2015   TOTAL PROTEIN 8.6* 8.6*   ALBUMIN 3.40* 3.50   GLOBULIN 5.2 5.1   ALT (SGPT) 14 14   AST (SGOT) 24 19   BILIRUBIN 0.3 0.6   ALK PHOS 59 65                     Brief Urine Lab Results     None        Microbiology Results (last 10 days)     Procedure Component Value - Date/Time    COVID PRE-OP / PRE-PROCEDURE SCREENING ORDER (NO ISOLATION) - Swab, Nasopharynx [[id number]]  (Normal) Collected: 10/06/21 0533    Lab Status: Final result Specimen: Swab from Nasopharynx Updated: 10/06/21 0725    Narrative:      The following orders were created for panel order COVID PRE-OP / PRE-PROCEDURE SCREENING ORDER (NO ISOLATION) - Swab, Nasopharynx.  Procedure                               Abnormality         Status                      ---------                               -----------         ------                     COVID-19,CEPHEID/JOSE/BD...[[id number]]  Normal              Final result                 Please view results for these tests on the individual orders.    COVID-19,CEPHEID/JOSE/BDMAX,COR/[facility]/PAD/DANTE IN-HOUSE(OR EMERGENT/ADD-ON),NP SWAB IN TRANSPORT MEDIA 3-4 HR TAT, RT-PCR - Swab, Nasopharynx [[id number]]  (Normal) Collected: 10/06/21 0533    Lab Status: Final result Specimen: Swab from Nasopharynx Updated: 10/06/21 0725     COVID19 Not Detected    Narrative:      Fact sheet for providers: https://www.fda.gov/media/479610/download     Fact sheet for patients: https://www.fda.gov/media/162230/download  Fact sheet for providers: https://www.fda.gov/media/661152/download     Fact sheet for patients: https://www.fda.gov/media/426635/download    Blood Culture - Blood, Arm, Left [[id number]]  (Abnormal) Collected: 10/03/21 2047    Lab Status: Preliminary result Specimen: Blood from Arm, Left Updated: 10/06/21 0908     Blood Culture Staphylococcus aureus, MRSA     Comment: Infectious disease consultation is highly recommended to rule out distant foci of infection.  Methicillin resistant Staphylococcus aureus, Patient may be an isolation risk.        Isolated from Anaerobic Bottle     Gram Stain Anaerobic Bottle Gram positive cocci in clusters    Blood Culture ID, PCR - Blood, Arm, Left [[id number]]  (Abnormal) Collected: 10/03/21 2047    Lab Status: Final result Specimen: Blood from Arm, Left Updated: 10/05/21 0957     BCID, PCR Staph aureus. mecA/C and MREJ (methicillin resistance gene) detected. Identification by BCID2 PCR.     BOTTLE TYPE Anaerobic Bottle    Narrative:      Infectious disease consultation is highly recommended to rule out distant foci of infection.    Blood Culture - Blood, Arm, Left [[id number]] Collected: 10/03/21 2015    Lab Status: Preliminary result Specimen: Blood from Arm, Left Updated: 10/05/21 2030     Blood  Culture No growth at 2 days    Wound Culture - Drainage, Leg, Right [[id number]]  (Abnormal)  (Susceptibility) Collected: 10/03/21 2015    Lab Status: Final result Specimen: Drainage from Leg, Right Updated: 10/05/21 1059     Wound Culture Scant growth (1+) Staphylococcus aureus, MRSA     Comment: Methicillin resistant Staphylococcus aureus, Patient may be an isolation risk.        Gram Stain Rare (1+) WBCs per low power field      Rare (1+) Gram positive cocci in pairs    Susceptibility      Staphylococcus aureus, MRSA      JORGE L      Clindamycin Susceptible      Erythromycin Resistant      Inducible Clindamycin Resistance Negative      Oxacillin Resistant      Penicillin G Resistant      Rifampin Susceptible      Tetracycline Susceptible      Trimethoprim + Sulfamethoxazole Susceptible      Vancomycin Susceptible               Linear View               Susceptibility Comments     Staphylococcus aureus, MRSA    This isolate does not demonstrate inducible clindamycin resistance in vitro.                                      Labs Pending at Discharge:  Pending Labs     Order Current Status    Blood Culture - Blood, Arm, Right In process    Blood Culture - Blood, Arm, Right In process          Procedures Performed           Consults:   Consults     Date and Time Order Name Status Description    10/6/2021  9:58 AM Inpatient General Surgery Consult Completed             Discharge Details        Discharge Medications      New Medications      Instructions Start Date   doxycycline 100 MG capsule  Commonly known as: VIBRAMYCIN   100 mg, Oral, 2 Times Daily         Continue These Medications      Instructions Start Date   amphetamine-dextroamphetamine 30 MG tablet  Commonly known as: ADDERALL   30 mg, Oral, 2 times daily      busPIRone 30 MG tablet  Commonly known as: BUSPAR   30 mg, Oral, 2 times daily      Descovy 200-25 MG per tablet  Generic drug: Emtricitabine-Tenofovir AF   1 tablet, Oral, Daily       HYDROcodone-acetaminophen 7.5-325 MG per tablet  Commonly known as: Norco   1 tablet, Oral, Every 6 Hours PRN      mupirocin 2 % ointment  Commonly known as: BACTROBAN   1 application, Topical, 3 Times Daily, Right upper thigh      Prezcobix 800-150 MG per tablet  Generic drug: Darunavir-Cobicistat   1 tablet, Oral, Daily      vitamin D 1.25 MG (91458 UT) capsule capsule  Commonly known as: ERGOCALCIFEROL   50,000 Units, Oral, Weekly         Stop These Medications    cephalexin 500 MG capsule  Commonly known as: KEFLEX     sulfamethoxazole-trimethoprim 800-160 MG per tablet  Commonly known as: BACTRIM DS,SEPTRA DS            No Known Allergies      Discharge Disposition:   Home or Self Care    Diet:  Hospital:  Diet Order   Procedures   • Diet Regular         Discharge Activity:         CODE STATUS:  Code Status and Medical Interventions:   Ordered at: 10/06/21 0256     Code Status:    CPR     Medical Interventions (Level of Support Prior to Arrest):    Full         Future Appointments   Date Time Provider Department Center   10/14/2021  8:00 AM Twila Lock MD [facility]           Time spent on Discharge including face to face service 33 minutes        Signature:      Electronically signed by Twila Lock MD at 10/06/21 1431       Discharge Order (From admission, onward)     Start     Ordered    10/06/21 1419  Discharge patient  Once        Expected Discharge Date: 10/06/21    Expected Discharge Time: Midday    Discharge Disposition: Home or Self Care    Physician of Record for Attribution - Please select from Treatment Team: TWILA LOCK [[id number]]    Review needed by CMO to determine Physician of Record: No       Question Answer Comment   Physician of Record for Attribution - Please select from Treatment Team TWILA LOCK    Review needed by CMO to determine Physician of Record No        10/06/21 1418

## 2021-11-05 ENCOUNTER — HOSPITAL ENCOUNTER (INPATIENT)
Facility: HOSPITAL | Age: 36
LOS: 3 days | Discharge: HOME | End: 2021-11-09
Attending: INTERNAL MEDICINE | Admitting: FAMILY MEDICINE

## 2021-11-05 DIAGNOSIS — L03.90 CELLULITIS, UNSPECIFIED CELLULITIS SITE: Primary | ICD-10-CM

## 2021-11-05 DIAGNOSIS — Z86.14 HISTORY OF MRSA INFECTION: ICD-10-CM

## 2021-11-05 LAB
ALBUMIN SERPL-MCNC: 2.9 G/DL (ref 3.5–5.2)
ALBUMIN/GLOB SERPL: 0.6 G/DL
ALP SERPL-CCNC: 63 U/L (ref 39–117)
ALT SERPL W P-5'-P-CCNC: 13 U/L (ref 1–41)
ANION GAP SERPL CALCULATED.3IONS-SCNC: 10 MMOL/L (ref 5–15)
AST SERPL-CCNC: 22 U/L (ref 1–40)
BASOPHILS # BLD AUTO: 0 10*3/MM3 (ref 0–0.2)
BASOPHILS NFR BLD AUTO: 0.6 % (ref 0–1.5)
BILIRUB SERPL-MCNC: 0.2 MG/DL (ref 0–1.2)
BUN SERPL-MCNC: 15 MG/DL (ref 6–20)
BUN/CREAT SERPL: 15.2 (ref 7–25)
CALCIUM SPEC-SCNC: 8.1 MG/DL (ref 8.6–10.5)
CHLORIDE SERPL-SCNC: 100 MMOL/L (ref 98–107)
CO2 SERPL-SCNC: 23 MMOL/L (ref 22–29)
CREAT SERPL-MCNC: 0.99 MG/DL (ref 0.76–1.27)
CRP SERPL-MCNC: 8.05 MG/DL (ref 0–0.5)
DEPRECATED RDW RBC AUTO: 49 FL (ref 37–54)
EOSINOPHIL # BLD AUTO: 0 10*3/MM3 (ref 0–0.4)
EOSINOPHIL NFR BLD AUTO: 0.8 % (ref 0.3–6.2)
ERYTHROCYTE [DISTWIDTH] IN BLOOD BY AUTOMATED COUNT: 16 % (ref 12.3–15.4)
ERYTHROCYTE [SEDIMENTATION RATE] IN BLOOD: 74 MM/HR (ref 0–15)
GFR SERPL CREATININE-BSD FRML MDRD: 87 ML/MIN/1.73
GLOBULIN UR ELPH-MCNC: 4.7 GM/DL
GLUCOSE SERPL-MCNC: 115 MG/DL (ref 65–99)
HCT VFR BLD AUTO: 35.3 % (ref 37.5–51)
HGB BLD-MCNC: 11.6 G/DL (ref 13–17.7)
LYMPHOCYTES # BLD AUTO: 1.1 10*3/MM3 (ref 0.7–3.1)
LYMPHOCYTES NFR BLD AUTO: 22.9 % (ref 19.6–45.3)
MCH RBC QN AUTO: 28.5 PG (ref 26.6–33)
MCHC RBC AUTO-ENTMCNC: 32.9 G/DL (ref 31.5–35.7)
MCV RBC AUTO: 86.5 FL (ref 79–97)
MONOCYTES # BLD AUTO: 0.7 10*3/MM3 (ref 0.1–0.9)
MONOCYTES NFR BLD AUTO: 13.9 % (ref 5–12)
NEUTROPHILS NFR BLD AUTO: 3 10*3/MM3 (ref 1.7–7)
NEUTROPHILS NFR BLD AUTO: 61.8 % (ref 42.7–76)
NRBC BLD AUTO-RTO: 0.1 /100 WBC (ref 0–0.2)
PLATELET # BLD AUTO: 214 10*3/MM3 (ref 140–450)
PMV BLD AUTO: 8 FL (ref 6–12)
POTASSIUM SERPL-SCNC: 4 MMOL/L (ref 3.5–5.2)
PROT SERPL-MCNC: 7.6 G/DL (ref 6–8.5)
RBC # BLD AUTO: 4.09 10*6/MM3 (ref 4.14–5.8)
SODIUM SERPL-SCNC: 133 MMOL/L (ref 136–145)
WBC # BLD AUTO: 4.8 10*3/MM3 (ref 3.4–10.8)

## 2021-11-05 PROCEDURE — 86140 C-REACTIVE PROTEIN: CPT

## 2021-11-05 PROCEDURE — 85652 RBC SED RATE AUTOMATED: CPT

## 2021-11-05 PROCEDURE — 85025 COMPLETE CBC W/AUTO DIFF WBC: CPT

## 2021-11-05 PROCEDURE — 80053 COMPREHEN METABOLIC PANEL: CPT

## 2021-11-05 PROCEDURE — 36415 COLL VENOUS BLD VENIPUNCTURE: CPT

## 2021-11-05 PROCEDURE — 99284 EMERGENCY DEPT VISIT MOD MDM: CPT

## 2021-11-05 PROCEDURE — 87040 BLOOD CULTURE FOR BACTERIA: CPT

## 2021-11-05 PROCEDURE — 25010000002 VANCOMYCIN 10 G RECONSTITUTED SOLUTION

## 2021-11-05 RX ORDER — SODIUM CHLORIDE 0.9 % (FLUSH) 0.9 %
10 SYRINGE (ML) INJECTION AS NEEDED
Status: DISCONTINUED | OUTPATIENT
Start: 2021-11-05 | End: 2021-11-09 | Stop reason: HOSPADM

## 2021-11-05 RX ADMIN — SODIUM CHLORIDE 500 ML: 9 INJECTION, SOLUTION INTRAVENOUS at 23:03

## 2021-11-05 RX ADMIN — VANCOMYCIN HYDROCHLORIDE 2000 MG: 10 INJECTION, POWDER, LYOPHILIZED, FOR SOLUTION INTRAVENOUS at 22:59

## 2021-11-06 PROBLEM — L98.9 GENERALIZED SKIN LESIONS: Status: ACTIVE | Noted: 2021-11-06

## 2021-11-06 LAB
25(OH)D3 SERPL-MCNC: 33.3 NG/ML (ref 30–100)
CA-I SERPL ISE-MCNC: 1.19 MMOL/L (ref 1.2–1.3)
GLUCOSE BLDC GLUCOMTR-MCNC: 79 MG/DL (ref 70–105)
HOLD SPECIMEN: NORMAL
WHOLE BLOOD HOLD SPECIMEN: NORMAL

## 2021-11-06 PROCEDURE — 99233 SBSQ HOSP IP/OBS HIGH 50: CPT | Performed by: HOSPITALIST

## 2021-11-06 PROCEDURE — 87536 HIV-1 QUANT&REVRSE TRNSCRPJ: CPT | Performed by: INTERNAL MEDICINE

## 2021-11-06 PROCEDURE — 86361 T CELL ABSOLUTE COUNT: CPT | Performed by: INTERNAL MEDICINE

## 2021-11-06 PROCEDURE — 25010000002 VANCOMYCIN 10 G RECONSTITUTED SOLUTION

## 2021-11-06 PROCEDURE — 82306 VITAMIN D 25 HYDROXY: CPT | Performed by: HOSPITALIST

## 2021-11-06 PROCEDURE — 82962 GLUCOSE BLOOD TEST: CPT

## 2021-11-06 PROCEDURE — 25010000002 VANCOMYCIN 10 G RECONSTITUTED SOLUTION: Performed by: INTERNAL MEDICINE

## 2021-11-06 PROCEDURE — 99221 1ST HOSP IP/OBS SF/LOW 40: CPT | Performed by: INTERNAL MEDICINE

## 2021-11-06 PROCEDURE — 83036 HEMOGLOBIN GLYCOSYLATED A1C: CPT | Performed by: HOSPITALIST

## 2021-11-06 PROCEDURE — 82330 ASSAY OF CALCIUM: CPT | Performed by: HOSPITALIST

## 2021-11-06 RX ORDER — ARIPIPRAZOLE 10 MG/1
10 TABLET ORAL DAILY
Status: DISCONTINUED | OUTPATIENT
Start: 2021-11-06 | End: 2021-11-09 | Stop reason: HOSPADM

## 2021-11-06 RX ORDER — ONDANSETRON 2 MG/ML
4 INJECTION INTRAMUSCULAR; INTRAVENOUS EVERY 6 HOURS PRN
Status: DISCONTINUED | OUTPATIENT
Start: 2021-11-06 | End: 2021-11-09 | Stop reason: HOSPADM

## 2021-11-06 RX ORDER — DEXTROAMPHETAMINE SACCHARATE, AMPHETAMINE ASPARTATE, DEXTROAMPHETAMINE SULFATE AND AMPHETAMINE SULFATE 7.5; 7.5; 7.5; 7.5 MG/1; MG/1; MG/1; MG/1
30 TABLET ORAL 2 TIMES DAILY
Status: DISCONTINUED | OUTPATIENT
Start: 2021-11-06 | End: 2021-11-06

## 2021-11-06 RX ORDER — ERGOCALCIFEROL 1.25 MG/1
50000 CAPSULE ORAL WEEKLY
Status: DISCONTINUED | OUTPATIENT
Start: 2021-11-06 | End: 2021-11-09 | Stop reason: HOSPADM

## 2021-11-06 RX ORDER — HYDROCODONE BITARTRATE AND ACETAMINOPHEN 7.5; 325 MG/1; MG/1
1 TABLET ORAL EVERY 6 HOURS PRN
Status: DISCONTINUED | OUTPATIENT
Start: 2021-11-06 | End: 2021-11-09 | Stop reason: HOSPADM

## 2021-11-06 RX ORDER — SODIUM CHLORIDE 0.9 % (FLUSH) 0.9 %
10 SYRINGE (ML) INJECTION AS NEEDED
Status: DISCONTINUED | OUTPATIENT
Start: 2021-11-06 | End: 2021-11-09 | Stop reason: HOSPADM

## 2021-11-06 RX ORDER — SODIUM CHLORIDE 0.9 % (FLUSH) 0.9 %
10 SYRINGE (ML) INJECTION EVERY 12 HOURS SCHEDULED
Status: DISCONTINUED | OUTPATIENT
Start: 2021-11-06 | End: 2021-11-09 | Stop reason: HOSPADM

## 2021-11-06 RX ORDER — BUSPIRONE HYDROCHLORIDE 15 MG/1
30 TABLET ORAL EVERY 12 HOURS SCHEDULED
Status: DISCONTINUED | OUTPATIENT
Start: 2021-11-06 | End: 2021-11-09 | Stop reason: HOSPADM

## 2021-11-06 RX ORDER — ACETAMINOPHEN 325 MG/1
650 TABLET ORAL EVERY 4 HOURS PRN
Status: DISCONTINUED | OUTPATIENT
Start: 2021-11-06 | End: 2021-11-09 | Stop reason: HOSPADM

## 2021-11-06 RX ADMIN — SODIUM CHLORIDE, PRESERVATIVE FREE 10 ML: 5 INJECTION INTRAVENOUS at 20:30

## 2021-11-06 RX ADMIN — BUSPIRONE HYDROCHLORIDE 30 MG: 15 TABLET ORAL at 09:29

## 2021-11-06 RX ADMIN — ERGOCALCIFEROL 50000 UNITS: 1.25 CAPSULE ORAL at 09:35

## 2021-11-06 RX ADMIN — EMTRICITABINE AND TENOFOVIR ALAFENAMIDE 1 TABLET: 200; 25 TABLET ORAL at 09:30

## 2021-11-06 RX ADMIN — ARIPIPRAZOLE 10 MG: 10 TABLET ORAL at 09:29

## 2021-11-06 RX ADMIN — DARUNAVIR ETHANOLATE AND COBICISTAT 1 TABLET: 800; 150 TABLET, FILM COATED ORAL at 09:30

## 2021-11-06 RX ADMIN — BUSPIRONE HYDROCHLORIDE 30 MG: 15 TABLET ORAL at 20:30

## 2021-11-06 RX ADMIN — HYDROCODONE BITARTRATE AND ACETAMINOPHEN 1 TABLET: 7.5; 325 TABLET ORAL at 03:32

## 2021-11-06 RX ADMIN — VANCOMYCIN HYDROCHLORIDE 1250 MG: 10 INJECTION, POWDER, LYOPHILIZED, FOR SOLUTION INTRAVENOUS at 12:20

## 2021-11-06 RX ADMIN — SODIUM CHLORIDE, PRESERVATIVE FREE 10 ML: 5 INJECTION INTRAVENOUS at 09:30

## 2021-11-06 RX ADMIN — VANCOMYCIN HYDROCHLORIDE 1250 MG: 10 INJECTION, POWDER, LYOPHILIZED, FOR SOLUTION INTRAVENOUS at 20:30

## 2021-11-06 NOTE — PLAN OF CARE
Goal Outcome Evaluation:  Plan of Care Reviewed With: patient        Progress: no change  Outcome Summary: pt admitted due to multiple lesions covering body. pt complains of pain. will continue to monitor pt

## 2021-11-06 NOTE — PROGRESS NOTES
"Pharmacy Antimicrobial Dosing Service    Subjective:  Wong Funez is a 36 y.o.male admitted with multiple generalized wounds. Pharmacy has been consulted to dose Vancomycin for possible SSTI.    PMH: hx of MRSA      Assessment/Plan    1. Day #2 Vancomycin: Goal trough 10-20 mcg/mL for uncomplicated SSTI. 2000mg (~22mg/kg ABW) IV x1 dose followed by 1250mg (~14mg/kg ABW) IV q12h.  Consult x2 days - no levels ordered at this time.    Will continue to monitor drug levels, renal function, culture and sensitivities, and patient clinical status.       Objective:  Relevant clinical data and objective history reviewed:  180.3 cm (71\")   90 kg (198 lb 6.6 oz)   Ideal body weight: 75.3 kg (166 lb 0.1 oz)  Adjusted ideal body weight: 81.2 kg (178 lb 15.5 oz)  Body mass index is 27.67 kg/m².        Results from last 7 days   Lab Units 11/05/21  2249   CREATININE mg/dL 0.99     Estimated Creatinine Clearance: 133.8 mL/min (by C-G formula based on SCr of 0.99 mg/dL).  No intake/output data recorded.    Results from last 7 days   Lab Units 11/05/21  2249   WBC 10*3/mm3 4.80     Temperature    11/05/21 2008   Temp: 97.3 °F (36.3 °C)     Baseline culture/source/susceptibility:  Microbiology Results (last 10 days)       ** No results found for the last 240 hours. **            Anti-Infectives (From admission, onward)      Ordered     Dose/Rate Route Frequency Start Stop    11/06/21 0251  vancomycin 1250 mg/250 mL 0.9% NS IVPB ([facility])        Ordering Provider: Mirtha Denny APRN    1,250 mg Intravenous Every 12 Hours 11/06/21 1100 11/08/21 1059    11/05/21 2241  vancomycin 2000 mg/500 mL 0.9% NS IVPB ([facility])        Ordering Provider: Mirtha Denny APRN    2,000 mg Intravenous Once 11/05/21 2243 11/06/21 0235    11/05/21 2237  Pharmacy to dose vancomycin        Ordering Provider: Mirtha Denny APRN     Does not apply Continuous PRN 11/05/21 2237 11/07/21 2236            Duncan Peres  11/06/21 02:51 EDT    "

## 2021-11-06 NOTE — CONSULTS
Infectious Diseases Consult Note    Referring Provider: Ponce Law MD    Reason for Consultation: Multiple skin boils    No care team member to display    Chief complaint multiple skin lesions        Subjective     History of present illness:      This is a 36-year-old white male with past medical history significant for HIV.  Patient follows with HIV clinic at the [facility] and he is currently on highly active antiretroviral therapy.  We do not know the status of his HIV since we do not have records available and the clinic is closed.  But the patient stated his numbers were good but he was not able to be specific.  His regimen was not changed for the last 2 years according to him.  The patient was complaining of multiple skin boils started few weeks ago.  Apparently he came to the emergency room at [facility] on October 3 and his blood culture was positive for MRSA.  He was asked to come in and was admitted on October 6, 2021 and was started on IV vancomycin and Zosyn and was switched to p.o. doxycycline and discharged home.  Our service never seen this patient before.  This time patient stated that he started to have multiple boils scattered all over his body.  He came to the emergency room and was admitted and started on IV vancomycin.  Consult evaluate the patient.  The patient denied having fever.  The patient is hemodynamically stable.  2 sets of blood culture was collected on admission.    Review of Systems   Review of Systems   Constitutional: Negative.    HENT: Negative.    Eyes: Negative.    Respiratory: Negative.    Cardiovascular: Negative.    Gastrointestinal: Negative.    Genitourinary: Negative.    Musculoskeletal: Negative.    Skin: Positive for rash.   Neurological: Negative.    Hematological: Negative.    Psychiatric/Behavioral: Negative.        Medications  Medications Prior to Admission   Medication Sig Dispense Refill Last Dose   • amphetamine-dextroamphetamine  (ADDERALL) 30 MG tablet Take 30 mg by mouth 2 (two) times a day.      • ARIPiprazole (ABILIFY) 10 MG tablet Take 10 mg by mouth Daily.      • busPIRone (BUSPAR) 30 MG tablet Take 30 mg by mouth 2 (two) times a day.      • Darunavir-Cobicistat (Prezcobix) 800-150 MG per tablet Take 1 tablet by mouth Daily.      • Emtricitabine-Tenofovir AF (Descovy) 200-25 MG per tablet Take 1 tablet by mouth Daily.      • FLUoxetine (PROzac) 20 MG capsule Take 20 mg by mouth 3 (Three) Times a Day.      • tadalafil (CIALIS) 20 MG tablet Daily As Needed.      • vitamin D (ERGOCALCIFEROL) 1.25 MG (82247 UT) capsule capsule Take 50,000 Units by mouth 1 (One) Time Per Week.          History  Past Medical History:   Diagnosis Date   • ADHD 2019   • Anxiety 2019   • Bipolar 1 disorder ([facility]) 2019   • Cellulitis of right leg     With abscess   • Depression 2019   • HIV (human immunodeficiency virus infection) ([facility])    • MRSA bacteremia      History reviewed. No pertinent surgical history.    Family History  Family History   Problem Relation Age of Onset   • Arthritis Mother    • Depression Mother    • Diabetes Mother    • COPD Father    • Depression Father    • Cancer Maternal Grandmother    • COPD Paternal Grandmother    • Depression Maternal Aunt    • Diabetes Maternal Aunt        Social History   reports that he has been smoking. He has been smoking about 0.00 packs per day for the past 0.00 years. He has never used smokeless tobacco. He reports previous alcohol use. He reports previous drug use.    Allergies  Patient has no known allergies.    Objective     Vital Signs   Vital Signs (last 24 hours)       11/05 0700  11/06 0659 11/06 0700  11/06 1512   Most Recent      Temp (°F) 97.3 -  99.1      97.7     97.7 (36.5) 11/06 1214    Heart Rate 103 -  121      68     68 11/06 1214    Resp 18 -  20      18     18 11/06 1214    /77 -  144/91      109/67     109/67 11/06 1214    SpO2 (%) 94 -  100      100     100 11/06 1214           Physical Exam:  Physical Exam  Vitals and nursing note reviewed.   Constitutional:       Appearance: He is well-developed.   HENT:      Head: Normocephalic and atraumatic.   Eyes:      Pupils: Pupils are equal, round, and reactive to light.   Cardiovascular:      Rate and Rhythm: Normal rate and regular rhythm.      Heart sounds: Normal heart sounds.   Pulmonary:      Effort: Pulmonary effort is normal. No respiratory distress.      Breath sounds: Normal breath sounds. No wheezing or rales.   Abdominal:      General: Bowel sounds are normal. There is no distension.      Palpations: Abdomen is soft. There is no mass.      Tenderness: There is no abdominal tenderness. There is no guarding or rebound.   Musculoskeletal:         General: No deformity. Normal range of motion.      Cervical back: Normal range of motion and neck supple.   Skin:     General: Skin is warm.      Findings: Lesion present. No erythema or rash.      Comments: Scattered lesions ranging from small folliculitis to small size abscesses involving the upper, lower extremities abdomen and back   Neurological:      Mental Status: He is alert and oriented to person, place, and time.      Cranial Nerves: No cranial nerve deficit.         Microbiology  Microbiology Results (last 10 days)     ** No results found for the last 240 hours. **          Laboratory  Results from last 7 days   Lab Units 11/05/21  2249   WBC 10*3/mm3 4.80   HEMOGLOBIN g/dL 11.6*   HEMATOCRIT % 35.3*   PLATELETS 10*3/mm3 214     Results from last 7 days   Lab Units 11/05/21  2249   SODIUM mmol/L 133*   POTASSIUM mmol/L 4.0   CHLORIDE mmol/L 100   CO2 mmol/L 23.0   BUN mg/dL 15   CREATININE mg/dL 0.99   GLUCOSE mg/dL 115*   CALCIUM mg/dL 8.1*     Results from last 7 days   Lab Units 11/05/21  2249   SODIUM mmol/L 133*   POTASSIUM mmol/L 4.0   CHLORIDE mmol/L 100   CO2 mmol/L 23.0   BUN mg/dL 15   CREATININE mg/dL 0.99   GLUCOSE mg/dL 115*   CALCIUM mg/dL 8.1*         Results  from last 7 days   Lab Units 11/05/21  2249   SED RATE mm/hr 74*           Radiology  Imaging Results (Last 72 Hours)     ** No results found for the last 72 hours. **          Cardiology      Results Review:  I have reviewed all clinical data, test, lab, and imaging results.       Schedule Meds  ARIPiprazole, 10 mg, Oral, Daily  busPIRone, 30 mg, Oral, Q12H  Darunavir-Cobicistat, 1 tablet, Oral, Daily  Emtricitabine-Tenofovir AF, 1 tablet, Oral, Daily  sodium chloride, 10 mL, Intravenous, Q12H  vancomycin, 1,250 mg, Intravenous, Q12H  vitamin D, 50,000 Units, Oral, Weekly        Infusion Meds  Pharmacy to dose vancomycin,   Pharmacy to dose vancomycin,         PRN Meds  •  acetaminophen  •  HYDROcodone-acetaminophen  •  ondansetron  •  Pharmacy to dose vancomycin  •  Pharmacy to dose vancomycin  •  [COMPLETED] Insert peripheral IV **AND** sodium chloride  •  sodium chloride      Assessment/Plan       Assessment    Recurrent skin and soft tissue infection with patient having multiple lesions ranging from folliculitis to small size abscesses.  Most likely caused by MRSA    Recent hospital admission for MRSA bacteremia secondary to above.  Patient was treated with short course of IV antibiotics and was switched to p.o. doxycycline  Patient denied history of IV drug use    HIV infection.  Status is unknown since were not able to have access to the HIV clinic records.  The patient apparently on highly active antiretroviral therapy    Plan    Continue IV vancomycin for now, will need at least 4 weeks of IV antibiotics  Waiting on blood culture results  Request repeat 2D echo  We might request transesophageal echo on Monday  HIV viral load and CD4 count  A.m. labs  Supportive care    Continue Darunavir-Cobicistat 1 tablet p.o. daily and  Emtricitabine-Tenofovir 1 tablet p.o. daily        Pau Mcintyre MD  11/06/21  15:12 EDT    Note is dictated utilizing voice recognition software/Dragon

## 2021-11-06 NOTE — PROGRESS NOTES
"     [facility]        Patient Name: Wong Funez  : 1985  MRN: [id number]  Primary Care Physician:  Jan Lock MD  Date of admission: 2021        Subjective       Chief Complaint: Generalized painful skin lesions.     History of Present Illness:   36-year-old  male with history of HIV infection on antiretroviral therapy, MRSA bacteremia with right leg cellulitis and abscess last month, bipolar disorder, and ADHD, presented to the ED complaining of appearance of generalized painful red raised skin lesions, which the patient says is not uncommon for him to get pimple-like areas that heal on their own.  He states that the lesion started to appear last Monday, and were getting progressively worse and more painful.  He denies having fever or chills, chest pain or shortness of breath, nausea vomiting abdominal pain or any other complaint.  He does report history of IV drug abuse in the past and the last reported use was on the summer 2021.     Emergency department course:  Afebrile, tachycardic, no acute distress.  ED NP stated in her physical examination the presence of \"abscess, erythema, lesion, and wounds \".  Labs were significant for CRP 8.05, sed rate 74 and hemoglobin 11.6.  Patient was given 2000 mg IV vancomycin and 500 cc bolus of normal saline.     ROS   Please refer to HPI. All other systems were reviewed and were negative.    2021; patient has a lot of skin lesions likely multiple abscesses patient picks on these lesions, they usually start out as a pimple and then the patient picks on it currently has multiple abscesses otherwise hemodynamically stable, await ID consult.  Personal History      Medical History        Past Medical History:   Diagnosis Date   • ADHD 2019   • Anxiety 2019   • Bipolar 1 disorder (HCC) 2019   • Cellulitis of right leg       With abscess   • Depression 2019   • HIV (human immunodeficiency virus " infection) ([facility])     • MRSA bacteremia              Surgical History   No past surgical history on file.        Family History: family history includes Arthritis in his mother; COPD in his father and paternal grandmother; Cancer in his maternal grandmother; Depression in his father, maternal aunt, and mother; Diabetes in his maternal aunt and mother. Otherwise pertinent FHx was reviewed and not pertinent to current issue.     Social History:  reports that he has never smoked. He does not have any smokeless tobacco history on file. He reports previous alcohol use. He reports that he does not use drugs.     Home Medications:           Prior to Admission Medications      Prescriptions Last Dose Informant Patient Reported? Taking?     amphetamine-dextroamphetamine (ADDERALL) 30 MG tablet     Yes No     Take 30 mg by mouth 2 (two) times a day.     ARIPiprazole (ABILIFY) 10 MG tablet   Self Yes No     Take 10 mg by mouth Daily.     busPIRone (BUSPAR) 30 MG tablet     Yes No     Take 30 mg by mouth 2 (two) times a day.     Darunavir-Cobicistat (Prezcobix) 800-150 MG per tablet   Pharmacy Yes No     Take 1 tablet by mouth Daily.     doxycycline (VIBRAMYCIN) 100 MG capsule     No No     Take 1 capsule by mouth 2 (Two) Times a Day.     Emtricitabine-Tenofovir AF (Descovy) 200-25 MG per tablet   Pharmacy Yes No     Take 1 tablet by mouth Daily.     FLUoxetine (PROzac) 20 MG capsule   Self Yes No     Take 20 mg by mouth 3 (Three) Times a Day.     HYDROcodone-acetaminophen (Norco) 7.5-325 MG per tablet     No No     Take 1 tablet by mouth Every 6 (Six) Hours As Needed for Moderate Pain .     mupirocin (BACTROBAN) 2 % ointment     Yes No     Apply 1 application topically to the appropriate area as directed 3 (Three) Times a Day. Right upper thigh     tadalafil (CIALIS) 20 MG tablet     Yes No     vitamin D (ERGOCALCIFEROL) 1.25 MG (30887 UT) capsule capsule   Pharmacy Yes No     Take 50,000 Units by mouth 1 (One) Time Per Week.  "               Allergies:  No Known Allergies     Objective       Vitals:   /78 (BP Location: Right arm, Patient Position: Lying)   Pulse 103   Temp 99.1 °F (37.3 °C) (Oral)   Resp 20   Ht 180.3 cm (71\")   Wt 90.4 kg (199 lb 4.8 oz)   SpO2 96%   BMI 27.80 kg/m²      Physical Exam   General: WD, WN, alert and oriented x3, no acute distress.   Eyes:  Show anicteric sclerae, moist conjunctivae with no lig lag; PERRLA.  HENT:  Normocephalic, atraumatic, moist oral mucosa.  Neck: Supple, no bruit, no JVP, no thyroid or lymph node enlargement, trachea central,   Lungs:  Good air entry. Clear to auscultation.   Heart: RRR, no murmur or rub.   Abdomen:  Soft, not tender, not distended, no organomegaly, bowel sounds positive.   Extremities: No leg edema or joint swelling, no calf tenderness, normal range of movement, pedal pulses intact.   Skin: Generalized erythematous papulovesicular tender lesions on the torso and upper and lower extremities.  Erythematous rash around the nose and mouth.  Neurology:  Grossly intact.   Psychiatric exam: Anxious and depressed., intact judgment and insight.        Result Review    Result Review:  I have personally reviewed the results from the time of this admission to 11/6/2021 00:25 EDT and agree with these findings:  [x]?  Laboratory  []?  Microbiology  []?  Radiology  []?  EKG/Telemetry   []?  Cardiology/Vascular   []?  Pathology  [x]?  Old records  []?  Other:     Lab Results   Component Value Date    GLUCOSE 115 (H) 11/05/2021    CALCIUM 8.1 (L) 11/05/2021     (L) 11/05/2021    K 4.0 11/05/2021    CO2 23.0 11/05/2021     11/05/2021    BUN 15 11/05/2021    CREATININE 0.99 11/05/2021    EGFRIFNONA 87 11/05/2021    BCR 15.2 11/05/2021    ANIONGAP 10.0 11/05/2021     No results found for: HGBA1C  Lab Results   Component Value Date    WBC 4.80 11/05/2021    HGB 11.6 (L) 11/05/2021    HCT 35.3 (L) 11/05/2021    MCV 86.5 11/05/2021     11/05/2021   No radiology " results for the last 7 days  Assessment/Plan          Active Hospital Problems:        Active Hospital Problems     Diagnosis     • **Generalized skin lesions         Infected papulovesicular lesions.      • Cellulitis        Assessment:  ·  Generalized infected papulovesicular lesions/abscesses.  Pain management  · Hyperglycemia mild; likely stress/reactive check A1c.  · Hypocalcemia; checked ionized calcium mildly low, vitamin D3 normal level, continue with vitamin D supplement  · Hyponatremia mild; asymptomatic continue to monitor.  · Normocytic normochromic anemia likely from HIV; continue to monitor.  ·  Hx of MRSA bacteremia from right leg cellulitis and abscess.  Continue vancomycin await blood cultures and ID recommendations.  ·  HIV infection-on antiretroviral therapy, continue.  · Bipolar disorder.  Continue Prozac and BuSpar Abilify    · ADHD.  Continue Adderall          DVT prophylaxis:  Low risk to provide DVT prophylaxis.     CODE STATUS:    Code Status (Patient has no pulse and is not breathing): CPR (Attempt to Resuscitate)  Medical Interventions (Patient has pulse or is breathing): Full Support    Electronically signed by Ponce Law MD, 11/06/21, 8:31 AM EDT.

## 2021-11-06 NOTE — PROGRESS NOTES
"Pharmacy Antimicrobial Dosing Service    Subjective:  Wong Funez is a 36 y.o.male admitted with multiple generalized wounds. Pharmacy has been consulted to dose Vancomycin for possible SSTI.    PMH: hx of MRSA      Assessment/Plan    1. Day #2 Vancomycin: Goal trough 10-20 mcg/mL for uncomplicated SSTI. 2000mg (~22mg/kg ABW) IV x1 dose followed by 1250mg (~14mg/kg ABW) IV q12h. Trough level ordered 11/7 at 1000 prior to 4th total dose. Predicted trough 15.4 mcg/ml.     Will continue to monitor drug levels, renal function, culture and sensitivities, and patient clinical status.       Objective:  Relevant clinical data and objective history reviewed:  180.3 cm (71\")   90.4 kg (199 lb 4.8 oz)   Ideal body weight: 75.3 kg (166 lb 0.1 oz)  Adjusted ideal body weight: 81.3 kg (179 lb 5.2 oz)  Body mass index is 27.8 kg/m².        Results from last 7 days   Lab Units 11/05/21  2249   CREATININE mg/dL 0.99     Estimated Creatinine Clearance: 120.9 mL/min (by C-G formula based on SCr of 0.99 mg/dL).  I/O last 3 completed shifts:  In: 480 [P.O.:480]  Out: -     Results from last 7 days   Lab Units 11/05/21  2249   WBC 10*3/mm3 4.80     Temperature    11/05/21 2008 11/06/21 0431   Temp: 97.3 °F (36.3 °C) 99.1 °F (37.3 °C)     Baseline culture/source/susceptibility:  Microbiology Results (last 10 days)       ** No results found for the last 240 hours. **            Anti-Infectives (From admission, onward)      Ordered     Dose/Rate Route Frequency Start Stop    11/06/21 0251  vancomycin 1250 mg/250 mL 0.9% NS IVPB ([facility])        Ordering Provider: Mirtha Denny APRN    1,250 mg Intravenous Every 12 Hours 11/06/21 1100 11/08/21 1059    11/06/21 0306  Darunavir-Cobicistat (PREZCOBIX) 800-150 MG per tablet 1 tablet        Ordering Provider: Gregoria Farrell MD    1 tablet Oral Daily 11/06/21 0900      11/06/21 0306  Emtricitabine-Tenofovir AF (DESCOVY) 200-25 MG per tablet 1 tablet        Ordering Provider: Gregoria Farrell MD "    1 tablet Oral Daily 11/06/21 0900      11/06/21 0306  Pharmacy to dose vancomycin        Ordering Provider: Gregoria Farrell MD     Does not apply Continuous PRN 11/06/21 0306 11/09/21 0305 11/05/21 2241  vancomycin 2000 mg/500 mL 0.9% NS IVPB ([facility])        Ordering Provider: Mirtha Denny APRN    2,000 mg Intravenous Once 11/05/21 2243 11/06/21 0235    11/05/21 2237  Pharmacy to dose vancomycin        Ordering Provider: Mirtha Denny APRN     Does not apply Continuous PRN 11/05/21 2237 11/07/21 2236            Katarina Mari, PharmD  11/06/21 09:36 EDT

## 2021-11-06 NOTE — PAYOR COMM NOTE
"CLINICALS FOR PENDING INPATIENT PRECERT:      Wong Funez (36 y.o. Male) 1985  PENDING AUTH # [id number]          AUTHORIZATION PENDING:   PLEASE CALL OR FAX DETERMINATION TO CONTACT BELOW. THANK YOU.        Ana M Faria RN MSN  /UR  [facility]  [phone] office  [phone] fax  [email or web address]    [facility]  NPI: [id number]  Tax: [phone]          Wong Funez (36 y.o. Male)             Date of Birth Social Security Number Address Home Phone MRN    1985  [address] [address] [phone] [id number]    Jehovah's witness Marital Status             Amish Single       Admission Date Admission Type Admitting Provider Attending Provider Department, Room/Bed    11/5/21 Emergency Ponce Law MD Prakash, [place], MD [facility] 2C MEDICAL INPATIENT, 246/1    Discharge Date Discharge Disposition Discharge Destination                         Attending Provider: Ponce Law MD    Allergies: No Known Allergies    Isolation: Contact   Infection: MRSA (10/04/21), COVID (History) (10/06/21)   Code Status: CPR   Advance Care Planning Activity    Ht: 180.3 cm (71\")   Wt: 90.4 kg (199 lb 4.8 oz)    Admission Cmt: None   Principal Problem: Generalized skin lesions [L98.9] More...                 Active Insurance as of 11/5/2021     Primary Coverage     Payor Plan Insurance Group Employer/Plan Group    [employer]ERCIAL AETNA [id number]     Payor Plan Address Payor Plan Phone Number Payor Plan Fax Number Effective Dates    [address] [phone]  1/1/2020 - None Entered    [place] [id number]       Subscriber Name Subscriber Birth Date Member ID       MONSTER WILLIAMSON 9/1/1983 [id number]                 Emergency Contacts      (Rel.) Home Phone Work Phone Mobile Phone    AZALIAANA M (Mother) -- [phone]        11/06/21 0020  Inpatient Admission  Once     " Completed     Comments: Infected skin lesions.   Level of Care: Med/Surg    Diagnosis: Generalized skin lesions [166122]    Isolate for COVID?: No    Certification: I Certify That Inpatient Hospital Services Are Medically Necessary For Greater Than 2 Midnights            Criteria Review: ALLEN   Verified inpatient order: 21 0020           Admitted from the ER with complaints of generalized painful red raised skin lesions throughout the body.  History of HIV infection on antiretroviral therapy and MRSA bacteremia with right leg cellulitis and abscess last month.  Vitals: T99.1 - P115 - R18 - /95  Sating 98% on room air.  Labs: EBC 4.80, CRP 8.05  Meds: IV Vancomycin. PRN PO [place]  Clinical documentation supports inpatient status.                     Cellulitis  Clinical Indications for Admission to Inpatient Care  Admission is indicated for 1 or more of the following(1)(2)(3)(4)(5)(6)(7):  Hemodynamic instability  Hemodynamic instability, as indicated by 1 or more of the following(1)(2)(3)(4)(5):  Vital sign abnormality not readily corrected by appropriate treatment, as indicated by 1 or more of the following[A]:  Tachycardia that persists despite appropriate treatment (eg, volume repletion, treatment of pain, treatment of underlying cause)  Significant immunosuppression (eg, long-term systemic steroids, neutropenia)            History & Physical      Gregoria Farrell MD at 21 0025              [facility] Medicine Services      Patient Name: Wong Funez  : 1985  MRN: [id number]  Primary Care Physician:  Jan Lock MD  Date of admission: 2021      Subjective      Chief Complaint: Generalized painful skin lesions.    History of Present Illness:   36-year-old  male with history of HIV infection on antiretroviral therapy, MRSA bacteremia with right leg cellulitis and abscess last month, bipolar disorder, and ADHD, presented to the ED complaining of  "appearance of generalized painful red raised skin lesions, which the patient says is not uncommon for him to get pimple-like areas that heal on their own.  He states that the lesion started to appear last Monday, and were getting progressively worse and more painful.  He denies having fever or chills, chest pain or shortness of breath, nausea vomiting abdominal pain or any other complaint.  He does report history of IV drug abuse in the past and the last reported use was on the summer 2021.    Emergency department course:  Afebrile, tachycardic, no acute distress.  ED NP stated in her physical examination the presence of \"abscess, erythema, lesion, and wounds \".  Labs were significant for CRP 8.05, sed rate 74 and hemoglobin 11.6.  Patient was given 2000 mg IV vancomycin and 500 cc bolus of normal saline.    ROS   Please refer to HPI. All other systems were reviewed and were negative.     Personal History     Past Medical History:   Diagnosis Date   • ADHD 2019   • Anxiety 2019   • Bipolar 1 disorder (HCC) 2019   • Cellulitis of right leg     With abscess   • Depression 2019   • HIV (human immunodeficiency virus infection) ([facility])    • MRSA bacteremia        No past surgical history on file.    Family History: family history includes Arthritis in his mother; COPD in his father and paternal grandmother; Cancer in his maternal grandmother; Depression in his father, maternal aunt, and mother; Diabetes in his maternal aunt and mother. Otherwise pertinent FHx was reviewed and not pertinent to current issue.    Social History:  reports that he has never smoked. He does not have any smokeless tobacco history on file. He reports previous alcohol use. He reports that he does not use drugs.    Home Medications:  Prior to Admission Medications     Prescriptions Last Dose Informant Patient Reported? Taking?    amphetamine-dextroamphetamine (ADDERALL) 30 MG tablet   Yes No    Take 30 mg by mouth 2 (two) times a day.    " ARIPiprazole (ABILIFY) 10 MG tablet  Self Yes No    Take 10 mg by mouth Daily.    busPIRone (BUSPAR) 30 MG tablet   Yes No    Take 30 mg by mouth 2 (two) times a day.    Darunavir-Cobicistat (Prezcobix) 800-150 MG per tablet  Pharmacy Yes No    Take 1 tablet by mouth Daily.    doxycycline (VIBRAMYCIN) 100 MG capsule   No No    Take 1 capsule by mouth 2 (Two) Times a Day.    Emtricitabine-Tenofovir AF (Descovy) 200-25 MG per tablet  Pharmacy Yes No    Take 1 tablet by mouth Daily.    FLUoxetine (PROzac) 20 MG capsule  Self Yes No    Take 20 mg by mouth 3 (Three) Times a Day.    HYDROcodone-acetaminophen (Norco) 7.5-325 MG per tablet   No No    Take 1 tablet by mouth Every 6 (Six) Hours As Needed for Moderate Pain .    mupirocin (BACTROBAN) 2 % ointment   Yes No    Apply 1 application topically to the appropriate area as directed 3 (Three) Times a Day. Right upper thigh    tadalafil (CIALIS) 20 MG tablet   Yes No    vitamin D (ERGOCALCIFEROL) 1.25 MG (42260 UT) capsule capsule  Pharmacy Yes No    Take 50,000 Units by mouth 1 (One) Time Per Week.            Allergies:  No Known Allergies    Objective      Vitals:   Temp:  [97.3 °F (36.3 °C)] 97.3 °F (36.3 °C)  Heart Rate:  [104-115] 104  Resp:  [18] 18  BP: (134-141)/(81-95) 134/81    Physical Exam   General: WD, WN, alert and oriented x3, no acute distress.   Eyes:  Show anicteric sclerae, moist conjunctivae with no lig lag; PERRLA.  HENT:  Normocephalic, atraumatic, moist oral mucosa.  Neck: Supple, no bruit, no JVP, no thyroid or lymph node enlargement, trachea central,   Lungs:  Good air entry. Clear to auscultation.   Heart: RRR, no murmur or rub.   Abdomen:  Soft, not tender, not distended, no organomegaly, bowel sounds positive.   Extremities: No leg edema or joint swelling, no calf tenderness, normal range of movement, pedal pulses intact.   Skin: Generalized erythematous papulovesicular tender lesions on the torso and upper and lower extremities.  Erythematous  rash around the nose and mouth.  Neurology:  Grossly intact.   Psychiatric exam: Anxious and depressed., intact judgment and insight.      Result Review    Result Review:  I have personally reviewed the results from the time of this admission to 11/6/2021 00:25 EDT and agree with these findings:  [x]  Laboratory  []  Microbiology  []  Radiology  []  EKG/Telemetry   []  Cardiology/Vascular   []  Pathology  [x]  Old records  []  Other:    Assessment/Plan        Active Hospital Problems:  Active Hospital Problems    Diagnosis    • **Generalized skin lesions      Infected papulovesicular lesions.     • Cellulitis      Assessment:  1.  Generalized infected papulovesicular lesions.    2.  Hx of MRSA bacteremia from right leg cellulitis and abscess.    3.  HIV infection-on antiretroviral therapy.    4.  Bipolar disorder.    5.  ADHD.    Plan:  -Admission to med/surg.  -Consult infectious disease.  -Continue empiric antibiotic coverage with vancomycin pending blood culture results and ID recommendations.  -Provide pain relief.    Home med rec not completed, home meds resume once reviewed by pharmacy.    DVT prophylaxis:  Low risk to provide DVT prophylaxis.    CODE STATUS:    Code Status (Patient has no pulse and is not breathing): CPR (Attempt to Resuscitate)  Medical Interventions (Patient has pulse or is breathing): Full Support    Admission Status:  I believe this patient meets inpatient status.    I discussed the patient's findings and my recommendations with patient.    This patient has been examined wearing appropriate Personal Protective Equipment and discussed with hospital infection control department. 11/06/21      Signature: Electronically signed by Gregoria Farrell MD, 11/06/21, 12:35 AM EDT.      Electronically signed by Gregorai Farrell MD at 11/06/21 0035          Emergency Department Notes      Mirtha Denny APRN at 11/05/21 2204     Attestation signed by Adis Delgado MD at 11/06/21 0113          For this  patient encounter, I reviewed the NP or PA documentation, treatment plan, and medical decision making. Adis Delgado MD 11/6/2021 01:13 EDT                  Subjective   Patient is a 36-year-old male who presents to the ER complaining of multiple wounds generalized on the body that are red raised and painful.  Patient reports that it is not uncommon for him to get pimple-like areas that heal on their own.  He developed these beginning on Monday and they have progressively gotten worse and more painful.  He attempted to go to urgent care where he was told that there was nothing that they could do.  States that he has had MRSA in the past.  Does have a history of HIV that he reports he is compliant with the treatment.  Patient does report a history of IV drug use in the past with last reported use the summer of 2021.  Patient has a significant history of anxiety that he reports taking  medication for but he is very anxious and tearful on exam.            Review of Systems    Past Medical History:   Diagnosis Date   • ADHD 2019   • Anxiety 2019   • Bipolar 1 disorder (HCC) 2019   • Cellulitis of right leg     With abscess   • Depression 2019   • HIV (human immunodeficiency virus infection) (formerly [facility])    • MRSA bacteremia        No Known Allergies    No past surgical history on file.    Family History   Problem Relation Age of Onset   • Arthritis Mother    • Depression Mother    • Diabetes Mother    • COPD Father    • Depression Father    • Cancer Maternal Grandmother    • COPD Paternal Grandmother    • Depression Maternal Aunt    • Diabetes Maternal Aunt        Social History     Socioeconomic History   • Marital status: Single   Tobacco Use   • Smoking status: Never Smoker   • Tobacco comment: Have been around it in social settings   Vaping Use   • Vaping Use: Never used   Substance and Sexual Activity   • Alcohol use: Not Currently     Alcohol/week: 0.0 standard drinks     Comment: Haven’t had any alcohol in around 3  "months   • Drug use: Never   • Sexual activity: Yes     Partners: Male     Birth control/protection: Condom           Objective   Physical Exam  Vitals reviewed.   HENT:      Head: Normocephalic.   Eyes:      Extraocular Movements: Extraocular movements intact.      Pupils: Pupils are equal, round, and reactive to light.   Cardiovascular:      Rate and Rhythm: Normal rate and regular rhythm.      Pulses: Normal pulses.      Heart sounds: Normal heart sounds. No murmur heard.      Pulmonary:      Effort: Pulmonary effort is normal. No respiratory distress.      Breath sounds: Normal breath sounds. No wheezing.   Abdominal:      General: Bowel sounds are normal.   Musculoskeletal:         General: Normal range of motion.   Skin:     General: Skin is warm and dry.      Findings: Abscess, erythema, lesion and wound present.             Comments: Multiple areas of abscess noted.    Neurological:      General: No focal deficit present.      Mental Status: He is alert and oriented to person, place, and time.   Psychiatric:         Mood and Affect: Mood is anxious. Affect is tearful.         Speech: Speech is rapid and pressured.         Behavior: Behavior normal. Behavior is cooperative.         Thought Content: Thought content normal.         Judgment: Judgment normal.         Procedures          ED Course  ED Course as of 11/06/21 0036   Fri Nov 05, 2021   2348 Patient was notified of plan for admission, tearful but verbal understanding of plan.  [BH]      ED Course User Index  [BH] Mirtha Denny, ARMEN      /81   Pulse 104   Temp 97.3 °F (36.3 °C)   Resp 18   Ht 180.3 cm (71\")   Wt 90 kg (198 lb 6.6 oz)   SpO2 94%   BMI 27.67 kg/m²   Labs Reviewed   COMPREHENSIVE METABOLIC PANEL - Abnormal; Notable for the following components:       Result Value    Glucose 115 (*)     Sodium 133 (*)     Calcium 8.1 (*)     Albumin 2.90 (*)     All other components within normal limits    Narrative:     GFR Normal " >60  Chronic Kidney Disease <60  Kidney Failure <15     C-REACTIVE PROTEIN - Abnormal; Notable for the following components:    C-Reactive Protein 8.05 (*)     All other components within normal limits   SEDIMENTATION RATE - Abnormal; Notable for the following components:    Sed Rate 74 (*)     All other components within normal limits   CBC WITH AUTO DIFFERENTIAL - Abnormal; Notable for the following components:    RBC 4.09 (*)     Hemoglobin 11.6 (*)     Hematocrit 35.3 (*)     RDW 16.0 (*)     Monocyte % 13.9 (*)     All other components within normal limits   BLOOD CULTURE   BLOOD CULTURE   CBC AND DIFFERENTIAL    Narrative:     The following orders were created for panel order CBC & Differential.  Procedure                               Abnormality         Status                     ---------                               -----------         ------                     CBC Auto Differential[[id number]]        Abnormal            Final result                 Please view results for these tests on the individual orders.   EXTRA TUBES    Narrative:     The following orders were created for panel order Extra Tubes.  Procedure                               Abnormality         Status                     ---------                               -----------         ------                     Gold Top - SST[[id number]]                                   Final result               Light Blue Top[[id number]]                                   Final result                 Please view results for these tests on the individual orders.   GOLD TOP - SST   LIGHT BLUE TOP     Medications   sodium chloride 0.9 % flush 10 mL (has no administration in time range)   Pharmacy to dose vancomycin (has no administration in time range)   sodium chloride 0.9 % bolus 500 mL (500 mL Intravenous New Bag 11/5/21 2995)   vancomycin 2000 mg/500 mL 0.9% NS IVPB ([facility]) (2,000 mg Intravenous New Bag 11/5/21 4222)     No radiology results for the last  day                                       MDM  Number of Diagnoses or Management Options  Cellulitis, unspecified cellulitis site  History of MRSA infection  Diagnosis management comments: While in the emergency room an IV was established and IV fluids were given.  Labs obtained CRP 8.05, ESR 74.  White count within normal limits.  IV antibiotics were started vancomycin and cefepime given in the emergency room.  Spoke with Dr. Cervantes, patient was admitted for observation and continuation of IV antibiotics.      Patient anxious on discussion of plan of care but gave verbal understanding.  Vital signs are improving, tachycardia thought to be related to patients anxiety.      Appropriate PPE worn by caring for the patient.           Chart review:Patient was seen in the ED 10/3, Incision and drainage was completed to wound to right posterior thigh, patient was given dose of antibiotics in the ED and given script for outpatient antibiotics.   Patient returned to the ED 10/6/2021, was admitted for IV antibiotics.   10/8/2021 Echo was completed, negative results       Final diagnoses:   Cellulitis, unspecified cellulitis site   History of MRSA infection       ED Disposition  ED Disposition     ED Disposition Condition Comment    Decision to Admit  Level of Care: Med/Surg [1]   Diagnosis: Generalized skin lesions [048281]   Isolate for COVID?: No [0]   Certification: I Certify That Inpatient Hospital Services Are Medically Necessary For Greater Than 2 Midnights            No follow-up provider specified.       Medication List      No changes were made to your prescriptions during this visit.          Mirtha Denny, ARMEN  11/06/21 0036      Electronically signed by Adis Delgado MD at 11/06/21 0113

## 2021-11-06 NOTE — ED PROVIDER NOTES
Subjective   Patient is a 36-year-old male who presents to the ER complaining of multiple wounds generalized on the body that are red raised and painful.  Patient reports that it is not uncommon for him to get pimple-like areas that heal on their own.  He developed these beginning on Monday and they have progressively gotten worse and more painful.  He attempted to go to urgent care where he was told that there was nothing that they could do.  States that he has had MRSA in the past.  Does have a history of HIV that he reports he is compliant with the treatment.  Patient does report a history of IV drug use in the past with last reported use the summer of 2021.  Patient has a significant history of anxiety that he reports taking  medication for but he is very anxious and tearful on exam.            Review of Systems    Past Medical History:   Diagnosis Date   • ADHD 2019   • Anxiety 2019   • Bipolar 1 disorder (HCC) 2019   • Cellulitis of right leg     With abscess   • Depression 2019   • HIV (human immunodeficiency virus infection) ([facility])    • MRSA bacteremia        No Known Allergies    No past surgical history on file.    Family History   Problem Relation Age of Onset   • Arthritis Mother    • Depression Mother    • Diabetes Mother    • COPD Father    • Depression Father    • Cancer Maternal Grandmother    • COPD Paternal Grandmother    • Depression Maternal Aunt    • Diabetes Maternal Aunt        Social History     Socioeconomic History   • Marital status: Single   Tobacco Use   • Smoking status: Never Smoker   • Tobacco comment: Have been around it in social settings   Vaping Use   • Vaping Use: Never used   Substance and Sexual Activity   • Alcohol use: Not Currently     Alcohol/week: 0.0 standard drinks     Comment: Haven’t had any alcohol in around 3 months   • Drug use: Never   • Sexual activity: Yes     Partners: Male     Birth control/protection: Condom           Objective   Physical Exam  Vitals reviewed.  "  HENT:      Head: Normocephalic.   Eyes:      Extraocular Movements: Extraocular movements intact.      Pupils: Pupils are equal, round, and reactive to light.   Cardiovascular:      Rate and Rhythm: Normal rate and regular rhythm.      Pulses: Normal pulses.      Heart sounds: Normal heart sounds. No murmur heard.      Pulmonary:      Effort: Pulmonary effort is normal. No respiratory distress.      Breath sounds: Normal breath sounds. No wheezing.   Abdominal:      General: Bowel sounds are normal.   Musculoskeletal:         General: Normal range of motion.   Skin:     General: Skin is warm and dry.      Findings: Abscess, erythema, lesion and wound present.             Comments: Multiple areas of abscess noted.    Neurological:      General: No focal deficit present.      Mental Status: He is alert and oriented to person, place, and time.   Psychiatric:         Mood and Affect: Mood is anxious. Affect is tearful.         Speech: Speech is rapid and pressured.         Behavior: Behavior normal. Behavior is cooperative.         Thought Content: Thought content normal.         Judgment: Judgment normal.         Procedures           ED Course  ED Course as of 11/06/21 0036   Fri Nov 05, 2021   8400 Patient was notified of plan for admission, tearful but verbal understanding of plan.  [BH]      ED Course User Index  [BH] Mirtha Denny, APRN      /81   Pulse 104   Temp 97.3 °F (36.3 °C)   Resp 18   Ht 180.3 cm (71\")   Wt 90 kg (198 lb 6.6 oz)   SpO2 94%   BMI 27.67 kg/m²   Labs Reviewed   COMPREHENSIVE METABOLIC PANEL - Abnormal; Notable for the following components:       Result Value    Glucose 115 (*)     Sodium 133 (*)     Calcium 8.1 (*)     Albumin 2.90 (*)     All other components within normal limits    Narrative:     GFR Normal >60  Chronic Kidney Disease <60  Kidney Failure <15     C-REACTIVE PROTEIN - Abnormal; Notable for the following components:    C-Reactive Protein 8.05 (*)     All " other components within normal limits   SEDIMENTATION RATE - Abnormal; Notable for the following components:    Sed Rate 74 (*)     All other components within normal limits   CBC WITH AUTO DIFFERENTIAL - Abnormal; Notable for the following components:    RBC 4.09 (*)     Hemoglobin 11.6 (*)     Hematocrit 35.3 (*)     RDW 16.0 (*)     Monocyte % 13.9 (*)     All other components within normal limits   BLOOD CULTURE   BLOOD CULTURE   CBC AND DIFFERENTIAL    Narrative:     The following orders were created for panel order CBC & Differential.  Procedure                               Abnormality         Status                     ---------                               -----------         ------                     CBC Auto Differential[[id number]]        Abnormal            Final result                 Please view results for these tests on the individual orders.   EXTRA TUBES    Narrative:     The following orders were created for panel order Extra Tubes.  Procedure                               Abnormality         Status                     ---------                               -----------         ------                     Gold Top - SST[[id number]]                                   Final result               Light Blue Top[[id number]]                                   Final result                 Please view results for these tests on the individual orders.   GOLD TOP - SST   LIGHT BLUE TOP     Medications   sodium chloride 0.9 % flush 10 mL (has no administration in time range)   Pharmacy to dose vancomycin (has no administration in time range)   sodium chloride 0.9 % bolus 500 mL (500 mL Intravenous New Bag 11/5/21 3828)   vancomycin 2000 mg/500 mL 0.9% NS IVPB ([facility]) (2,000 mg Intravenous New Bag 11/5/21 7381)     No radiology results for the last day                                       MDM  Number of Diagnoses or Management Options  Cellulitis, unspecified cellulitis site  History of MRSA infection  Diagnosis  management comments: While in the emergency room an IV was established and IV fluids were given.  Labs obtained CRP 8.05, ESR 74.  White count within normal limits.  IV antibiotics were started vancomycin and cefepime given in the emergency room.  Spoke with Dr. Cervantes, patient was admitted for observation and continuation of IV antibiotics.      Patient anxious on discussion of plan of care but gave verbal understanding.  Vital signs are improving, tachycardia thought to be related to patients anxiety.      Appropriate PPE worn by caring for the patient.           Chart review:Patient was seen in the ED 10/3, Incision and drainage was completed to wound to right posterior thigh, patient was given dose of antibiotics in the ED and given script for outpatient antibiotics.   Patient returned to the ED 10/6/2021, was admitted for IV antibiotics.   10/8/2021 Echo was completed, negative results       Final diagnoses:   Cellulitis, unspecified cellulitis site   History of MRSA infection       ED Disposition  ED Disposition     ED Disposition Condition Comment    Decision to Admit  Level of Care: Med/Surg [1]   Diagnosis: Generalized skin lesions [397020]   Isolate for COVID?: No [0]   Certification: I Certify That Inpatient Hospital Services Are Medically Necessary For Greater Than 2 Midnights            No follow-up provider specified.       Medication List      No changes were made to your prescriptions during this visit.          Mirtha Denny, APRN  11/06/21 0036

## 2021-11-06 NOTE — H&P
"    [facility]      Patient Name: Wong Funez  : 1985  MRN: [id number]  Primary Care Physician:  Jan Lock MD  Date of admission: 2021      Subjective      Chief Complaint: Generalized painful skin lesions.    History of Present Illness:   36-year-old  male with history of HIV infection on antiretroviral therapy, MRSA bacteremia with right leg cellulitis and abscess last month, bipolar disorder, and ADHD, presented to the ED complaining of appearance of generalized painful red raised skin lesions, which the patient says is not uncommon for him to get pimple-like areas that heal on their own.  He states that the lesion started to appear last Monday, and were getting progressively worse and more painful.  He denies having fever or chills, chest pain or shortness of breath, nausea vomiting abdominal pain or any other complaint.  He does report history of IV drug abuse in the past and the last reported use was on the summer 2021.    Emergency department course:  Afebrile, tachycardic, no acute distress.  ED NP stated in her physical examination the presence of \"abscess, erythema, lesion, and wounds \".  Labs were significant for CRP 8.05, sed rate 74 and hemoglobin 11.6.  Patient was given 2000 mg IV vancomycin and 500 cc bolus of normal saline.    ROS   Please refer to HPI. All other systems were reviewed and were negative.     Personal History     Past Medical History:   Diagnosis Date   • ADHD 2019   • Anxiety 2019   • Bipolar 1 disorder (HCC) 2019   • Cellulitis of right leg     With abscess   • Depression 2019   • HIV (human immunodeficiency virus infection) (HCC)    • MRSA bacteremia        No past surgical history on file.    Family History: family history includes Arthritis in his mother; COPD in his father and paternal grandmother; Cancer in his maternal grandmother; Depression in his father, maternal aunt, and mother; Diabetes in his " maternal aunt and mother. Otherwise pertinent FHx was reviewed and not pertinent to current issue.    Social History:  reports that he has never smoked. He does not have any smokeless tobacco history on file. He reports previous alcohol use. He reports that he does not use drugs.    Home Medications:  Prior to Admission Medications     Prescriptions Last Dose Informant Patient Reported? Taking?    amphetamine-dextroamphetamine (ADDERALL) 30 MG tablet   Yes No    Take 30 mg by mouth 2 (two) times a day.    ARIPiprazole (ABILIFY) 10 MG tablet  Self Yes No    Take 10 mg by mouth Daily.    busPIRone (BUSPAR) 30 MG tablet   Yes No    Take 30 mg by mouth 2 (two) times a day.    Darunavir-Cobicistat (Prezcobix) 800-150 MG per tablet  Pharmacy Yes No    Take 1 tablet by mouth Daily.    doxycycline (VIBRAMYCIN) 100 MG capsule   No No    Take 1 capsule by mouth 2 (Two) Times a Day.    Emtricitabine-Tenofovir AF (Descovy) 200-25 MG per tablet  Pharmacy Yes No    Take 1 tablet by mouth Daily.    FLUoxetine (PROzac) 20 MG capsule  Self Yes No    Take 20 mg by mouth 3 (Three) Times a Day.    HYDROcodone-acetaminophen (Norco) 7.5-325 MG per tablet   No No    Take 1 tablet by mouth Every 6 (Six) Hours As Needed for Moderate Pain .    mupirocin (BACTROBAN) 2 % ointment   Yes No    Apply 1 application topically to the appropriate area as directed 3 (Three) Times a Day. Right upper thigh    tadalafil (CIALIS) 20 MG tablet   Yes No    vitamin D (ERGOCALCIFEROL) 1.25 MG (70996 UT) capsule capsule  Pharmacy Yes No    Take 50,000 Units by mouth 1 (One) Time Per Week.            Allergies:  No Known Allergies    Objective      Vitals:   Temp:  [97.3 °F (36.3 °C)] 97.3 °F (36.3 °C)  Heart Rate:  [104-115] 104  Resp:  [18] 18  BP: (134-141)/(81-95) 134/81    Physical Exam   General: WD, WN, alert and oriented x3, no acute distress.   Eyes:  Show anicteric sclerae, moist conjunctivae with no lig lag; PERRLA.  HENT:  Normocephalic, atraumatic,  moist oral mucosa.  Neck: Supple, no bruit, no JVP, no thyroid or lymph node enlargement, trachea central,   Lungs:  Good air entry. Clear to auscultation.   Heart: RRR, no murmur or rub.   Abdomen:  Soft, not tender, not distended, no organomegaly, bowel sounds positive.   Extremities: No leg edema or joint swelling, no calf tenderness, normal range of movement, pedal pulses intact.   Skin: Generalized erythematous papulovesicular tender lesions on the torso and upper and lower extremities.  Erythematous rash around the nose and mouth.  Neurology:  Grossly intact.   Psychiatric exam: Anxious and depressed., intact judgment and insight.      Result Review    Result Review:  I have personally reviewed the results from the time of this admission to 11/6/2021 00:25 EDT and agree with these findings:  [x]  Laboratory  []  Microbiology  []  Radiology  []  EKG/Telemetry   []  Cardiology/Vascular   []  Pathology  [x]  Old records  []  Other:    Assessment/Plan        Active Hospital Problems:  Active Hospital Problems    Diagnosis    • **Generalized skin lesions      Infected papulovesicular lesions.     • Cellulitis      Assessment:  1.  Generalized infected papulovesicular lesions.    2.  Hx of MRSA bacteremia from right leg cellulitis and abscess.    3.  HIV infection-on antiretroviral therapy.    4.  Bipolar disorder.    5.  ADHD.    Plan:  -Admission to med/surg.  -Consult infectious disease.  -Continue empiric antibiotic coverage with vancomycin pending blood culture results and ID recommendations.  -Provide pain relief.    Home med rec not completed, home meds resume once reviewed by pharmacy.    DVT prophylaxis:  Low risk to provide DVT prophylaxis.    CODE STATUS:    Code Status (Patient has no pulse and is not breathing): CPR (Attempt to Resuscitate)  Medical Interventions (Patient has pulse or is breathing): Full Support    Admission Status:  I believe this patient meets inpatient status.    I discussed the  patient's findings and my recommendations with patient.    This patient has been examined wearing appropriate Personal Protective Equipment and discussed with hospital infection control department. 11/06/21      Signature: Electronically signed by Gregoria Farrell MD, 11/06/21, 12:35 AM EDT.

## 2021-11-07 ENCOUNTER — APPOINTMENT (OUTPATIENT)
Dept: CARDIOLOGY | Facility: HOSPITAL | Age: 36
End: 2021-11-07

## 2021-11-07 LAB
ALBUMIN SERPL-MCNC: 2.9 G/DL (ref 3.5–5.2)
ALBUMIN/GLOB SERPL: 0.7 G/DL
ALP SERPL-CCNC: 59 U/L (ref 39–117)
ALT SERPL W P-5'-P-CCNC: 12 U/L (ref 1–41)
ANION GAP SERPL CALCULATED.3IONS-SCNC: 11 MMOL/L (ref 5–15)
AST SERPL-CCNC: 14 U/L (ref 1–40)
BACTERIA SPEC AEROBE CULT: ABNORMAL
BASOPHILS # BLD AUTO: 0 10*3/MM3 (ref 0–0.2)
BASOPHILS NFR BLD AUTO: 0.5 % (ref 0–1.5)
BH CV ECHO MEAS - BSA(HAYCOCK): 2.2 M^2
BH CV ECHO MEAS - BSA: 2.1 M^2
BH CV ECHO MEAS - BZI_BMI: 28.2 KILOGRAMS/M^2
BH CV ECHO MEAS - BZI_METRIC_HEIGHT: 180.3 CM
BH CV ECHO MEAS - BZI_METRIC_WEIGHT: 91.6 KG
BILIRUB SERPL-MCNC: 0.2 MG/DL (ref 0–1.2)
BUN SERPL-MCNC: 9 MG/DL (ref 6–20)
BUN/CREAT SERPL: 10.3 (ref 7–25)
CALCIUM SPEC-SCNC: 8 MG/DL (ref 8.6–10.5)
CHLORIDE SERPL-SCNC: 105 MMOL/L (ref 98–107)
CK SERPL-CCNC: 15 U/L (ref 20–200)
CO2 SERPL-SCNC: 22 MMOL/L (ref 22–29)
CREAT SERPL-MCNC: 0.87 MG/DL (ref 0.76–1.27)
DEPRECATED RDW RBC AUTO: 48.6 FL (ref 37–54)
EOSINOPHIL # BLD AUTO: 0.1 10*3/MM3 (ref 0–0.4)
EOSINOPHIL NFR BLD AUTO: 1.3 % (ref 0.3–6.2)
ERYTHROCYTE [DISTWIDTH] IN BLOOD BY AUTOMATED COUNT: 16 % (ref 12.3–15.4)
GFR SERPL CREATININE-BSD FRML MDRD: 100 ML/MIN/1.73
GLOBULIN UR ELPH-MCNC: 4.4 GM/DL
GLUCOSE SERPL-MCNC: 108 MG/DL (ref 65–99)
GRAM STN SPEC: ABNORMAL
HAV IGM SERPL QL IA: NORMAL
HBV CORE IGM SERPL QL IA: NORMAL
HBV SURFACE AG SERPL QL IA: NORMAL
HCT VFR BLD AUTO: 32.5 % (ref 37.5–51)
HCV AB SER DONR QL: NORMAL
HGB BLD-MCNC: 10.9 G/DL (ref 13–17.7)
ISOLATED FROM: ABNORMAL
LYMPHOCYTES # BLD AUTO: 1.4 10*3/MM3 (ref 0.7–3.1)
LYMPHOCYTES NFR BLD AUTO: 31.1 % (ref 19.6–45.3)
MCH RBC QN AUTO: 29.2 PG (ref 26.6–33)
MCHC RBC AUTO-ENTMCNC: 33.6 G/DL (ref 31.5–35.7)
MCV RBC AUTO: 86.8 FL (ref 79–97)
MONOCYTES # BLD AUTO: 0.6 10*3/MM3 (ref 0.1–0.9)
MONOCYTES NFR BLD AUTO: 13 % (ref 5–12)
NEUTROPHILS NFR BLD AUTO: 2.4 10*3/MM3 (ref 1.7–7)
NEUTROPHILS NFR BLD AUTO: 54.1 % (ref 42.7–76)
NRBC BLD AUTO-RTO: 0.1 /100 WBC (ref 0–0.2)
PLATELET # BLD AUTO: 232 10*3/MM3 (ref 140–450)
PMV BLD AUTO: 7.8 FL (ref 6–12)
POTASSIUM SERPL-SCNC: 3.8 MMOL/L (ref 3.5–5.2)
PROT SERPL-MCNC: 7.3 G/DL (ref 6–8.5)
RBC # BLD AUTO: 3.74 10*6/MM3 (ref 4.14–5.8)
SODIUM SERPL-SCNC: 138 MMOL/L (ref 136–145)
VANCOMYCIN PEAK SERPL-MCNC: 16.2 MCG/ML (ref 20–40)
VANCOMYCIN TROUGH SERPL-MCNC: 9.5 MCG/ML (ref 5–20)
WBC # BLD AUTO: 4.5 10*3/MM3 (ref 3.4–10.8)

## 2021-11-07 PROCEDURE — 80202 ASSAY OF VANCOMYCIN: CPT | Performed by: INTERNAL MEDICINE

## 2021-11-07 PROCEDURE — 85025 COMPLETE CBC W/AUTO DIFF WBC: CPT | Performed by: INTERNAL MEDICINE

## 2021-11-07 PROCEDURE — 80074 ACUTE HEPATITIS PANEL: CPT | Performed by: INTERNAL MEDICINE

## 2021-11-07 PROCEDURE — 99233 SBSQ HOSP IP/OBS HIGH 50: CPT | Performed by: HOSPITALIST

## 2021-11-07 PROCEDURE — 87205 SMEAR GRAM STAIN: CPT | Performed by: SURGERY

## 2021-11-07 PROCEDURE — 82550 ASSAY OF CK (CPK): CPT | Performed by: INTERNAL MEDICINE

## 2021-11-07 PROCEDURE — 80202 ASSAY OF VANCOMYCIN: CPT | Performed by: HOSPITALIST

## 2021-11-07 PROCEDURE — 93308 TTE F-UP OR LMTD: CPT

## 2021-11-07 PROCEDURE — 25010000002 DAPTOMYCIN PER 1 MG: Performed by: INTERNAL MEDICINE

## 2021-11-07 PROCEDURE — 80053 COMPREHEN METABOLIC PANEL: CPT | Performed by: INTERNAL MEDICINE

## 2021-11-07 PROCEDURE — 87147 CULTURE TYPE IMMUNOLOGIC: CPT | Performed by: SURGERY

## 2021-11-07 PROCEDURE — 0 LIDOCAINE 1 % SOLUTION: Performed by: SURGERY

## 2021-11-07 PROCEDURE — 93325 DOPPLER ECHO COLOR FLOW MAPG: CPT

## 2021-11-07 PROCEDURE — 25010000002 VANCOMYCIN 10 G RECONSTITUTED SOLUTION: Performed by: INTERNAL MEDICINE

## 2021-11-07 PROCEDURE — 99222 1ST HOSP IP/OBS MODERATE 55: CPT | Performed by: INTERNAL MEDICINE

## 2021-11-07 PROCEDURE — 87070 CULTURE OTHR SPECIMN AEROBIC: CPT | Performed by: SURGERY

## 2021-11-07 PROCEDURE — 99221 1ST HOSP IP/OBS SF/LOW 40: CPT | Performed by: SURGERY

## 2021-11-07 PROCEDURE — 87186 SC STD MICRODIL/AGAR DIL: CPT | Performed by: SURGERY

## 2021-11-07 RX ORDER — LIDOCAINE HYDROCHLORIDE 10 MG/ML
5 INJECTION, SOLUTION INFILTRATION; PERINEURAL ONCE
Status: COMPLETED | OUTPATIENT
Start: 2021-11-07 | End: 2021-11-07

## 2021-11-07 RX ADMIN — DAPTOMYCIN 550 MG: 500 INJECTION, POWDER, LYOPHILIZED, FOR SOLUTION INTRAVENOUS at 17:43

## 2021-11-07 RX ADMIN — DARUNAVIR ETHANOLATE AND COBICISTAT 1 TABLET: 800; 150 TABLET, FILM COATED ORAL at 09:51

## 2021-11-07 RX ADMIN — SODIUM CHLORIDE, PRESERVATIVE FREE 10 ML: 5 INJECTION INTRAVENOUS at 09:50

## 2021-11-07 RX ADMIN — BUSPIRONE HYDROCHLORIDE 30 MG: 15 TABLET ORAL at 20:38

## 2021-11-07 RX ADMIN — HYDROCODONE BITARTRATE AND ACETAMINOPHEN 1 TABLET: 7.5; 325 TABLET ORAL at 20:38

## 2021-11-07 RX ADMIN — BUSPIRONE HYDROCHLORIDE 30 MG: 15 TABLET ORAL at 09:50

## 2021-11-07 RX ADMIN — SODIUM CHLORIDE, PRESERVATIVE FREE 10 ML: 5 INJECTION INTRAVENOUS at 20:39

## 2021-11-07 RX ADMIN — ARIPIPRAZOLE 10 MG: 10 TABLET ORAL at 09:51

## 2021-11-07 RX ADMIN — LIDOCAINE HYDROCHLORIDE 5 ML: 10 INJECTION, SOLUTION INFILTRATION; PERINEURAL at 17:07

## 2021-11-07 RX ADMIN — EMTRICITABINE AND TENOFOVIR ALAFENAMIDE 1 TABLET: 200; 25 TABLET ORAL at 09:51

## 2021-11-07 RX ADMIN — VANCOMYCIN HYDROCHLORIDE 1250 MG: 10 INJECTION, POWDER, LYOPHILIZED, FOR SOLUTION INTRAVENOUS at 12:22

## 2021-11-07 NOTE — CONSULTS
Cardiology Consult Note    Patient Identification:  Name: Wong Funez  Age: 36 y.o.  Sex: male  :  1985  MRN: [id number]             Requesting Physician : MELVIN Powers    Reason for Consultation / Chief Complaint : Rule out endocarditis    History of Present Illness:      This is a 36-year-old with PMH of    HIV infection on antiretroviral therapy  ADHD, bipolar disorder, anxiety, depression  NKDA  Family history of arthritis in mother and COPD in father  History of drug abuse including meth and IVDA      Presented with cellulitis was found to have MRSA bacteremia therefore cardiology is consulted to do DENYS rule out endocarditis. Patient was recently in the hospital with MRSA infection was treated with antibiotics and switched to p.o. antibiotics and home comes in with worsening lesions.  Patient had an echocardiogram 10/6/2021 which showed normal LV function and mildly dilated RV.  Labs revealed normal CMP. Hemoglobin 10.9 with MCV of 86.8. Hepatitis profiles are negative.  Blood cultures from 10/3/2021 show MRSA.      Assessment:  :    MRSA bacteremia  Hyperglycemia, hypocalcemia, hyponatremia, anemia, HIV      Recommendations / Plan:        Patient was recently in the hospital had MRSA bacteremia. Was treated with a short course of IV antibiotics switched to p.o. doxycycline and now presents with multiple skin lesions. Patient has previous history of drug abuse. Risk of endocarditis is high. Patient denies any dysphagia or odynophagia.  Will check echocardiogram if needed we will schedule for DENYS. Risk benefits alternatives explained.  Counseled patient on drug abuse cessation  Discussed with ID physician                  Diagnosis Plan   1. Cellulitis, unspecified cellulitis site     2. History of MRSA infection                Past Medical History:  Past Medical History:   Diagnosis Date   • ADHD 2019   • Anxiety 2019   • Bipolar 1 disorder (HCC) 2019   • Cellulitis of right leg      With abscess   • Depression 2019   • HIV (human immunodeficiency virus infection) (HCC)    • MRSA bacteremia      Past Surgical History:  History reviewed. No pertinent surgical history.   Allergies:  No Known Allergies  Home Meds:  Medications Prior to Admission   Medication Sig Dispense Refill Last Dose   • amphetamine-dextroamphetamine (ADDERALL) 30 MG tablet Take 30 mg by mouth 2 (two) times a day.      • ARIPiprazole (ABILIFY) 10 MG tablet Take 10 mg by mouth Daily.      • busPIRone (BUSPAR) 30 MG tablet Take 30 mg by mouth 2 (two) times a day.      • Darunavir-Cobicistat (Prezcobix) 800-150 MG per tablet Take 1 tablet by mouth Daily.      • Emtricitabine-Tenofovir AF (Descovy) 200-25 MG per tablet Take 1 tablet by mouth Daily.      • FLUoxetine (PROzac) 20 MG capsule Take 20 mg by mouth 3 (Three) Times a Day.      • tadalafil (CIALIS) 20 MG tablet Daily As Needed.      • vitamin D (ERGOCALCIFEROL) 1.25 MG (37960 UT) capsule capsule Take 50,000 Units by mouth 1 (One) Time Per Week. Monday        Current Meds:     Current Facility-Administered Medications:   •  acetaminophen (TYLENOL) tablet 650 mg, 650 mg, Oral, Q4H PRN, Gregoria Farrell MD  •  ARIPiprazole (ABILIFY) tablet 10 mg, 10 mg, Oral, Daily, Gregoria Farrell MD, 10 mg at 11/07/21 0951  •  busPIRone (BUSPAR) tablet 30 mg, 30 mg, Oral, Q12H, Gregoria Farrell MD, 30 mg at 11/07/21 0950  •  DAPTOmycin (CUBICIN) 550 mg in sodium chloride 0.9 % 50 mL IVPB, 550 mg, Intravenous, Q24H, Pau Mcintyre MD  •  Darunavir-Cobicistat (PREZCOBIX) 800-150 MG per tablet 1 tablet, 1 tablet, Oral, Daily, Gregoria Farrell MD, 1 tablet at 11/07/21 0951  •  Emtricitabine-Tenofovir AF (DESCOVY) 200-25 MG per tablet 1 tablet, 1 tablet, Oral, Daily, Gregoria Farrell MD, 1 tablet at 11/07/21 0951  •  HYDROcodone-acetaminophen (NORCO) 7.5-325 MG per tablet 1 tablet, 1 tablet, Oral, Q6H PRN, Gregoria Farrell MD, 1 tablet at 11/06/21 0332  •  ondansetron (ZOFRAN) injection 4 mg, 4 mg, Intravenous, Q6H  "PRN, Gregoria Farrell MD  •  [COMPLETED] Insert peripheral IV, , , Once **AND** sodium chloride 0.9 % flush 10 mL, 10 mL, Intravenous, PRN, Mirtha Denny APRN  •  sodium chloride 0.9 % flush 10 mL, 10 mL, Intravenous, Q12H, Gregoria Farrell MD, 10 mL at 11/07/21 0950  •  sodium chloride 0.9 % flush 10 mL, 10 mL, Intravenous, PRN, Gregoria Farrell MD  •  vitamin D (ERGOCALCIFEROL) capsule 50,000 Units, 50,000 Units, Oral, Weekly, Ponce Law MD, 50,000 Units at 11/06/21 0935  Social History:   Social History     Tobacco Use   • Smoking status: Light Tobacco Smoker     Packs/day: 0.00     Years: 0.00     Pack years: 0.00   • Smokeless tobacco: Never Used   • Tobacco comment: Have been around it in social settings   Substance Use Topics   • Alcohol use: Not Currently     Alcohol/week: 0.0 standard drinks     Comment: socially      Family History:  Family History   Problem Relation Age of Onset   • Arthritis Mother    • Depression Mother    • Diabetes Mother    • COPD Father    • Depression Father    • Cancer Maternal Grandmother    • COPD Paternal Grandmother    • Depression Maternal Aunt    • Diabetes Maternal Aunt         Review of Systems : Review of Systems   Skin: Positive for color change, itching and rash.   All other systems reviewed and are negative.         Constitutional:  Temp:  [98.3 °F (36.8 °C)-99 °F (37.2 °C)] 98.4 °F (36.9 °C)  Heart Rate:  [66-87] 66  Resp:  [16-17] 17  BP: (116-133)/(69-76) 132/76    Physical Exam   /76 (BP Location: Left arm, Patient Position: Lying)   Pulse 66   Temp 98.4 °F (36.9 °C) (Oral)   Resp 17   Ht 180.3 cm (71\")   Wt 91.6 kg (202 lb)   SpO2 100%   BMI 28.17 kg/m²   Physical Exam  General:  Appears in no acute distress  Eyes: Sclera is anicteric,  conjunctiva is clear   HEENT:  No JVD. Thyroid not visibly enlarged. No mucosal pallor or cyanosis  Respiratory: Respirations regular and unlabored at rest.  Bilaterally good breath sounds, with good air entry in all " fields. No crackles, rubs or wheezes auscultated  Cardiovascular: S1,S2 Regular rate and rhythm. No murmur, rub or gallop auscultated. No pretibial pitting edema  Gastrointestinal: Abdomen soft, flat, non tender. Bowel sounds present.   Musculoskeletal:  No abnormal movements  Extremities: No digital clubbing or cyanosis  Skin: Multiple skin lesions with lesions ranging from folliculitis to medium sized abscess there was one is in the medial aspect of left thigh and left wrist.  Neuro: Alert and awake, no lateralizing deficits appreciated    Cardiographics  ECG: EKG tracing was  personally reviewed/interpreted by me  No orders to display       Telemetry: Sinus rhythm    Echocardiogram:   Results for orders placed during the hospital encounter of 10/06/21    Adult Transthoracic Echo Complete W/ Cont if Necessary Per Protocol    Interpretation Summary  · Left ventricular ejection fraction appears to be 61 - 65%.  · The right ventricular cavity is mildly dilated.  · No pericardial effusion noted      Imaging  Chest X-ray:   Imaging Results (Last 24 Hours)     ** No results found for the last 24 hours. **          Lab Review: I have reviewed the labs  Results from last 7 days   Lab Units 11/07/21  0952   CK TOTAL U/L 15*         Results from last 7 days   Lab Units 11/07/21  0324   SODIUM mmol/L 138   POTASSIUM mmol/L 3.8   BUN mg/dL 9   CREATININE mg/dL 0.87   CALCIUM mg/dL 8.0*     @[id number]@  Results from last 7 days   Lab Units 11/07/21  0324 11/05/21  2249   WBC 10*3/mm3 4.50 4.80   HEMOGLOBIN g/dL 10.9* 11.6*   HEMATOCRIT % 32.5* 35.3*   PLATELETS 10*3/mm3 232 214                 Taye Alvarado MD  11/7/2021, 15:05 EST      EMR Dragon/Transcription:   Dictated utilizing Dragon dictation  Much of the above report is an electronic transcription/translation of the spoken language to printed text using Dragon Software. As such, the subtleties and finesse of the spoken language may permit erroneous, or at  times, nonsensical words or phrases to be inadvertently transcribed; thus changes may be made at a later date to rectify these errors.

## 2021-11-07 NOTE — PLAN OF CARE
Goal Outcome Evaluation:  Plan of Care Reviewed With: patient        Progress: no change  Outcome Summary: pt has had no complaints overnight, will continue to monitor pt

## 2021-11-07 NOTE — PROGRESS NOTES
Infectious Diseases Progress Note      LOS: 1 day   No care team member to display    Chief Complaint: Painful skin lesions    Subjective     The patient had no fever during the last 24 hours.  The patient remained hemodynamically stable.  The patient denied having any new complaints today.    Review of Systems:   Review of Systems   Constitutional: Negative.    HENT: Negative.    Eyes: Negative.    Respiratory: Negative.    Cardiovascular: Negative.    Gastrointestinal: Negative.    Genitourinary: Negative.    Musculoskeletal: Negative.    Skin:        Multiple skin lesions   Neurological: Negative.    Hematological: Negative.    Psychiatric/Behavioral: Negative.         Objective     Vital Signs  Temp:  [98.3 °F (36.8 °C)-99 °F (37.2 °C)] 98.4 °F (36.9 °C)  Heart Rate:  [66-87] 66  Resp:  [16-17] 17  BP: (116-133)/(69-76) 132/76    Physical Exam:  Physical Exam  Vitals and nursing note reviewed.   Constitutional:       Appearance: He is well-developed.   HENT:      Head: Normocephalic and atraumatic.   Eyes:      Pupils: Pupils are equal, round, and reactive to light.   Cardiovascular:      Rate and Rhythm: Normal rate and regular rhythm.      Heart sounds: Normal heart sounds.   Pulmonary:      Effort: Pulmonary effort is normal. No respiratory distress.      Breath sounds: Normal breath sounds. No wheezing or rales.   Abdominal:      General: Bowel sounds are normal. There is no distension.      Palpations: Abdomen is soft. There is no mass.      Tenderness: There is no abdominal tenderness. There is no guarding or rebound.   Musculoskeletal:         General: No deformity. Normal range of motion.      Cervical back: Normal range of motion and neck supple.   Skin:     Findings: No erythema or rash.      Comments: Multiple skin lesions with lesions ranging from folliculitis to medium size abscess.  The worst one is on the medial aspect of the left thigh and the left wrist.   Neurological:      Mental Status: He is  alert and oriented to person, place, and time.      Cranial Nerves: No cranial nerve deficit.          Results Review:    I have reviewed all clinical data, test, lab, and imaging results.     Radiology  No Radiology Exams Resulted Within Past 24 Hours    Cardiology    Laboratory    Results from last 7 days   Lab Units 11/07/21 0324 11/05/21 2249   WBC 10*3/mm3 4.50 4.80   HEMOGLOBIN g/dL 10.9* 11.6*   HEMATOCRIT % 32.5* 35.3*   PLATELETS 10*3/mm3 232 214     Results from last 7 days   Lab Units 11/07/21 0324 11/05/21 2249   SODIUM mmol/L 138 133*   POTASSIUM mmol/L 3.8 4.0   CHLORIDE mmol/L 105 100   CO2 mmol/L 22.0 23.0   BUN mg/dL 9 15   CREATININE mg/dL 0.87 0.99   GLUCOSE mg/dL 108* 115*   ALBUMIN g/dL 2.90* 2.90*   BILIRUBIN mg/dL 0.2 0.2   ALK PHOS U/L 59 63   AST (SGOT) U/L 14 22   ALT (SGPT) U/L 12 13   CALCIUM mg/dL 8.0* 8.1*     Results from last 7 days   Lab Units 11/07/21  0952   CK TOTAL U/L 15*     Results from last 7 days   Lab Units 11/05/21 2249   SED RATE mm/hr 74*         Microbiology   Microbiology Results (last 10 days)     Procedure Component Value - Date/Time    Blood Culture - Blood, Arm, Left [[id number]]  (Normal) Collected: 11/05/21 2249    Lab Status: Preliminary result Specimen: Blood from Arm, Left Updated: 11/06/21 2300     Blood Culture No growth at 24 hours    Blood Culture - Blood, Arm, Right [[id number]]  (Normal) Collected: 11/05/21 2249    Lab Status: Preliminary result Specimen: Blood from Arm, Right Updated: 11/06/21 2300     Blood Culture No growth at 24 hours          Medication Review:       Schedule Meds  ARIPiprazole, 10 mg, Oral, Daily  busPIRone, 30 mg, Oral, Q12H  DAPTOmycin, 550 mg, Intravenous, Q24H  Darunavir-Cobicistat, 1 tablet, Oral, Daily  Emtricitabine-Tenofovir AF, 1 tablet, Oral, Daily  sodium chloride, 10 mL, Intravenous, Q12H  vitamin D, 50,000 Units, Oral, Weekly        Infusion Meds       PRN Meds  •  acetaminophen  •  HYDROcodone-acetaminophen  •   ondansetron  •  [COMPLETED] Insert peripheral IV **AND** sodium chloride  •  sodium chloride        Assessment/Plan       Antimicrobial Therapy   1.         2.        3.        4.        5.              Assessment     Recurrent skin and soft tissue infection with patient having multiple lesions ranging from folliculitis to small size abscesses.  Most likely caused by MRSA     Recent hospital admission for MRSA bacteremia secondary to above.  Patient was treated with short course of IV antibiotics and was switched to p.o. doxycycline  Patient denied history of IV drug use     HIV infection.  Status is unknown since were not able to have access to the HIV clinic records.  The patient apparently on highly active antiretroviral therapy    History of IV drug use, using methamphetamine.  Stated that he quit in July 2021     Plan     Discontinue IV vancomycin  Start daptomycin 6 mg/kg IV daily after obtaining CPK  Waiting on blood culture results  Consult cardiology for transesophageal echo.  Will be done tomorrow  HIV viral load and CD4 count, pending  Try to obtain records from HIV clinic tomorrow  A.m. labs including CPK  Supportive care  Consult general surgery.  Patient will probably need I&D of the left thigh abscess  Hepatitis panel     Continue Darunavir-Cobicistat 1 tablet p.o. daily and  Emtricitabine-Tenofovir 1 tablet p.o. daily         Pau Mcintyre MD  11/07/21  14:54 EST    Note is dictated utilizing voice recognition software/Dragon

## 2021-11-07 NOTE — CONSULTS
Subjective   Wong Funez is a 36 y.o. male.     History of present illness  Was asked to see Wong in consultation for evaluation of left thigh abscess for drainage.  Patient has a history of skin lesions and abscesses.  He is HIV positive.  He has a small abscess about 2 cm in the left medial thigh that ID would like opened and drained.  Discussed this with the patient and he agrees.    Past Medical History:   Diagnosis Date   • ADHD 2019   • Anxiety 2019   • Bipolar 1 disorder (HCC) 2019   • Cellulitis of right leg     With abscess   • Depression 2019   • HIV (human immunodeficiency virus infection) ([facility])    • MRSA bacteremia        History reviewed. No pertinent surgical history.    [unfilled]    No Known Allergies    Family History   Problem Relation Age of Onset   • Arthritis Mother    • Depression Mother    • Diabetes Mother    • COPD Father    • Depression Father    • Cancer Maternal Grandmother    • COPD Paternal Grandmother    • Depression Maternal Aunt    • Diabetes Maternal Aunt        Social History     Socioeconomic History   • Marital status: Single   Tobacco Use   • Smoking status: Light Tobacco Smoker     Packs/day: 0.00     Years: 0.00     Pack years: 0.00   • Smokeless tobacco: Never Used   • Tobacco comment: Have been around it in social settings   Vaping Use   • Vaping Use: Never used   Substance and Sexual Activity   • Alcohol use: Not Currently     Alcohol/week: 0.0 standard drinks     Comment: socially   • Drug use: Not Currently   • Sexual activity: Yes     Partners: Male     Birth control/protection: Condom       The following portions of the patient's history were reviewed and updated as appropriate: allergies, current medications, past family history, past medical history, past social history, past surgical history and problem list.    Objective   Complete review of systems is done and unremarkable exception the chief complaint and the above noted specific exceptions.    Exam is  limited system and hand the left thigh has a 2 cm abscess with some necrotic skin overlying it.    In the patient's room the area is prepped with Betadine x3 anesthetized with lidocaine and after adequate analgesia it is opened with an 18-gauge needle about a quarter of an inch and the abscess was drained.  Cultures are taken.  Sterile dressing is applied.  He tolerated the procedure well.    Assessment/Plan                  Adis Hallman,   11/7/2021  17:10 EST

## 2021-11-07 NOTE — PROGRESS NOTES
"     [facility] Medicine Services        Patient Name: Wong Funez  : 1985  MRN: [id number]  Primary Care Physician:  Jan Lock MD  Date of admission: 2021        Subjective       Chief Complaint: Generalized painful skin lesions.     History of Present Illness:   36-year-old  male with history of HIV infection on antiretroviral therapy, MRSA bacteremia with right leg cellulitis and abscess last month, bipolar disorder, and ADHD, presented to the ED complaining of appearance of generalized painful red raised skin lesions, which the patient says is not uncommon for him to get pimple-like areas that heal on their own.  He states that the lesion started to appear last Monday, and were getting progressively worse and more painful.  He denies having fever or chills, chest pain or shortness of breath, nausea vomiting abdominal pain or any other complaint.  He does report history of IV drug abuse in the past and the last reported use was on the summer 2021.     Emergency department course:  Afebrile, tachycardic, no acute distress.  ED NP stated in her physical examination the presence of \"abscess, erythema, lesion, and wounds \".  Labs were significant for CRP 8.05, sed rate 74 and hemoglobin 11.6.  Patient was given 2000 mg IV vancomycin and 500 cc bolus of normal saline.     ROS   Please refer to HPI. All other systems were reviewed and were negative.    2021; patient has a lot of skin lesions likely multiple abscesses patient picks on these lesions, they usually start out as a pimple and then the patient picks on it currently has multiple abscesses otherwise hemodynamically stable, await ID consult.  2021; patient wanted to go home, very emotional, otherwise hemodynamically stable, patient needs long-term IV antibiotics as per ID for possible DENYS tomorrow.  No new symptoms endorsed, spoke with the bedside RN.  Personal History      Medical History      "   Past Medical History:   Diagnosis Date   • ADHD 2019   • Anxiety 2019   • Bipolar 1 disorder (HCC) 2019   • Cellulitis of right leg       With abscess   • Depression 2019   • HIV (human immunodeficiency virus infection) ([facility])     • MRSA bacteremia              Surgical History   No past surgical history on file.        Family History: family history includes Arthritis in his mother; COPD in his father and paternal grandmother; Cancer in his maternal grandmother; Depression in his father, maternal aunt, and mother; Diabetes in his maternal aunt and mother. Otherwise pertinent FHx was reviewed and not pertinent to current issue.     Social History:  reports that he has never smoked. He does not have any smokeless tobacco history on file. He reports previous alcohol use. He reports that he does not use drugs.     Home Medications:           Prior to Admission Medications      Prescriptions Last Dose Informant Patient Reported? Taking?     amphetamine-dextroamphetamine (ADDERALL) 30 MG tablet     Yes No     Take 30 mg by mouth 2 (two) times a day.     ARIPiprazole (ABILIFY) 10 MG tablet   Self Yes No     Take 10 mg by mouth Daily.     busPIRone (BUSPAR) 30 MG tablet     Yes No     Take 30 mg by mouth 2 (two) times a day.     Darunavir-Cobicistat (Prezcobix) 800-150 MG per tablet   Pharmacy Yes No     Take 1 tablet by mouth Daily.     doxycycline (VIBRAMYCIN) 100 MG capsule     No No     Take 1 capsule by mouth 2 (Two) Times a Day.     Emtricitabine-Tenofovir AF (Descovy) 200-25 MG per tablet   Pharmacy Yes No     Take 1 tablet by mouth Daily.     FLUoxetine (PROzac) 20 MG capsule   Self Yes No     Take 20 mg by mouth 3 (Three) Times a Day.     HYDROcodone-acetaminophen (Norco) 7.5-325 MG per tablet     No No     Take 1 tablet by mouth Every 6 (Six) Hours As Needed for Moderate Pain .     mupirocin (BACTROBAN) 2 % ointment     Yes No     Apply 1 application topically to the appropriate area as directed 3 (Three) Times  "a Day. Right upper thigh     tadalafil (CIALIS) 20 MG tablet     Yes No     vitamin D (ERGOCALCIFEROL) 1.25 MG (33481 UT) capsule capsule   Pharmacy Yes No     Take 50,000 Units by mouth 1 (One) Time Per Week.                Allergies:  No Known Allergies     Objective       Vitals:   /76 (BP Location: Left arm, Patient Position: Lying)   Pulse 66   Temp 98.4 °F (36.9 °C) (Oral)   Resp 17   Ht 180.3 cm (71\")   Wt 91.6 kg (202 lb)   SpO2 100%   BMI 28.17 kg/m²      Physical Exam   General: WD, WN, alert and oriented x3, no acute distress.   Eyes:  Show anicteric sclerae, moist conjunctivae with no lig lag; PERRLA.  HENT:  Normocephalic, atraumatic, moist oral mucosa.  Neck: Supple, no bruit, no JVP, no thyroid or lymph node enlargement, trachea central,   Lungs:  Good air entry. Clear to auscultation.   Heart: RRR, no murmur or rub.   Abdomen:  Soft, not tender, not distended, no organomegaly, bowel sounds positive.   Extremities: No leg edema or joint swelling, no calf tenderness, normal range of movement, pedal pulses intact.   Skin: Generalized erythematous papulovesicular/pustular tender lesions on the torso and upper and lower extremities.    Neurology:  Grossly intact.   Psychiatric exam: Anxious and depressed., intact judgment and insight.        Result Review    Result Review:  I have personally reviewed the results from the time of this admission to 11/6/2021 00:25 EDT and agree with these findings:  [x]?  Laboratory  []?  Microbiology  []?  Radiology  []?  EKG/Telemetry   []?  Cardiology/Vascular   []?  Pathology  [x]?  Old records  []?  Other:     Lab Results   Component Value Date    GLUCOSE 108 (H) 11/07/2021    CALCIUM 8.0 (L) 11/07/2021     11/07/2021    K 3.8 11/07/2021    CO2 22.0 11/07/2021     11/07/2021    BUN 9 11/07/2021    CREATININE 0.87 11/07/2021    EGFRIFNONA 100 11/07/2021    BCR 10.3 11/07/2021    ANIONGAP 11.0 11/07/2021     No results found for: [facility]  Lab " Results   Component Value Date    WBC 4.50 11/07/2021    HGB 10.9 (L) 11/07/2021    HCT 32.5 (L) 11/07/2021    MCV 86.8 11/07/2021     11/07/2021   No radiology results for the last 7 days  Assessment/Plan          Active Hospital Problems:        Active Hospital Problems     Diagnosis     • **Generalized skin lesions         Infected papulovesicular lesions.      • Cellulitis        Assessment:  · Generalized folliculitis and small abscesses likely MRSA; ID consult appreciated, continue IV vancomycin.  Awaiting blood cultures, pain management  · Hyperglycemia mild; likely stress/reactive, check A1c in process.  · Hypocalcemia; checked ionized calcium mildly low, vitamin D3 normal level, continue with vitamin D supplement  · Hyponatremia mild; asymptomatic continue to monitor.  · Normocytic normochromic anemia likely from HIV; continue to monitor.  · Hx of MRSA bacteremia from right leg cellulitis and abscess.  Continue vancomycin await blood cultures and ID recommendations.  · HIV infection-on antiretroviral therapy, continue, HIV viral load and CD4 count.  · Bipolar disorder.  Continue Prozac and BuSpar Abilify  · ADHD.   Adderall     · Disposition; ID might request transesophageal echocardiogram on Monday     DVT prophylaxis:  Low risk to provide DVT prophylaxis.     CODE STATUS:    Code Status (Patient has no pulse and is not breathing): CPR (Attempt to Resuscitate)  Medical Interventions (Patient has pulse or is breathing): Full Support    Electronically signed by Ponce Law MD, 11/07/21, 2:09 PM EST.

## 2021-11-08 LAB
ALBUMIN SERPL-MCNC: 2.9 G/DL (ref 3.5–5.2)
ALBUMIN/GLOB SERPL: 0.7 G/DL
ALP SERPL-CCNC: 54 U/L (ref 39–117)
ALT SERPL W P-5'-P-CCNC: 13 U/L (ref 1–41)
ANION GAP SERPL CALCULATED.3IONS-SCNC: 12 MMOL/L (ref 5–15)
ANISOCYTOSIS BLD QL: ABNORMAL
AST SERPL-CCNC: 15 U/L (ref 1–40)
BILIRUB SERPL-MCNC: 0.2 MG/DL (ref 0–1.2)
BUN SERPL-MCNC: 9 MG/DL (ref 6–20)
BUN/CREAT SERPL: 9.8 (ref 7–25)
CALCIUM SPEC-SCNC: 8.4 MG/DL (ref 8.6–10.5)
CHLORIDE SERPL-SCNC: 110 MMOL/L (ref 98–107)
CK SERPL-CCNC: 13 U/L (ref 20–200)
CO2 SERPL-SCNC: 23 MMOL/L (ref 22–29)
CREAT SERPL-MCNC: 0.92 MG/DL (ref 0.76–1.27)
DEPRECATED RDW RBC AUTO: 48.6 FL (ref 37–54)
EOSINOPHIL # BLD MANUAL: 0.05 10*3/MM3 (ref 0–0.4)
EOSINOPHIL NFR BLD MANUAL: 1 % (ref 0.3–6.2)
ERYTHROCYTE [DISTWIDTH] IN BLOOD BY AUTOMATED COUNT: 16 % (ref 12.3–15.4)
GFR SERPL CREATININE-BSD FRML MDRD: 94 ML/MIN/1.73
GLOBULIN UR ELPH-MCNC: 4.4 GM/DL
GLUCOSE SERPL-MCNC: 77 MG/DL (ref 65–99)
HBA1C MFR BLD: 5 % (ref 3.5–5.6)
HCT VFR BLD AUTO: 32.3 % (ref 37.5–51)
HGB BLD-MCNC: 10.8 G/DL (ref 13–17.7)
LYMPHOCYTES # BLD MANUAL: 1.01 10*3/MM3 (ref 0.7–3.1)
LYMPHOCYTES NFR BLD MANUAL: 21 % (ref 19.6–45.3)
LYMPHOCYTES NFR BLD MANUAL: 5 % (ref 5–12)
MCH RBC QN AUTO: 28.9 PG (ref 26.6–33)
MCHC RBC AUTO-ENTMCNC: 33.3 G/DL (ref 31.5–35.7)
MCV RBC AUTO: 86.9 FL (ref 79–97)
MONOCYTES # BLD AUTO: 0.24 10*3/MM3 (ref 0.1–0.9)
NEUTROPHILS # BLD AUTO: 3.5 10*3/MM3 (ref 1.7–7)
NEUTROPHILS NFR BLD MANUAL: 60 % (ref 42.7–76)
NEUTS BAND NFR BLD MANUAL: 13 % (ref 0–5)
PLAT MORPH BLD: NORMAL
PLATELET # BLD AUTO: 216 10*3/MM3 (ref 140–450)
PMV BLD AUTO: 8.2 FL (ref 6–12)
POTASSIUM SERPL-SCNC: 4 MMOL/L (ref 3.5–5.2)
PROT SERPL-MCNC: 7.3 G/DL (ref 6–8.5)
RBC # BLD AUTO: 3.72 10*6/MM3 (ref 4.14–5.8)
SCAN SLIDE: NORMAL
SODIUM SERPL-SCNC: 145 MMOL/L (ref 136–145)
WBC # BLD AUTO: 4.8 10*3/MM3 (ref 3.4–10.8)
WBC MORPH BLD: NORMAL

## 2021-11-08 PROCEDURE — 80053 COMPREHEN METABOLIC PANEL: CPT | Performed by: INTERNAL MEDICINE

## 2021-11-08 PROCEDURE — 99233 SBSQ HOSP IP/OBS HIGH 50: CPT | Performed by: FAMILY MEDICINE

## 2021-11-08 PROCEDURE — 99232 SBSQ HOSP IP/OBS MODERATE 35: CPT | Performed by: INTERNAL MEDICINE

## 2021-11-08 PROCEDURE — 82550 ASSAY OF CK (CPK): CPT | Performed by: INTERNAL MEDICINE

## 2021-11-08 PROCEDURE — 25010000002 DAPTOMYCIN PER 1 MG: Performed by: INTERNAL MEDICINE

## 2021-11-08 PROCEDURE — 85007 BL SMEAR W/DIFF WBC COUNT: CPT | Performed by: INTERNAL MEDICINE

## 2021-11-08 PROCEDURE — 85025 COMPLETE CBC W/AUTO DIFF WBC: CPT | Performed by: INTERNAL MEDICINE

## 2021-11-08 RX ADMIN — SODIUM CHLORIDE, PRESERVATIVE FREE 10 ML: 5 INJECTION INTRAVENOUS at 22:00

## 2021-11-08 RX ADMIN — DARUNAVIR ETHANOLATE AND COBICISTAT 1 TABLET: 800; 150 TABLET, FILM COATED ORAL at 08:30

## 2021-11-08 RX ADMIN — BUSPIRONE HYDROCHLORIDE 30 MG: 15 TABLET ORAL at 22:00

## 2021-11-08 RX ADMIN — EMTRICITABINE AND TENOFOVIR ALAFENAMIDE 1 TABLET: 200; 25 TABLET ORAL at 08:30

## 2021-11-08 RX ADMIN — BUSPIRONE HYDROCHLORIDE 30 MG: 15 TABLET ORAL at 08:30

## 2021-11-08 RX ADMIN — DAPTOMYCIN 550 MG: 500 INJECTION, POWDER, LYOPHILIZED, FOR SOLUTION INTRAVENOUS at 16:24

## 2021-11-08 RX ADMIN — ARIPIPRAZOLE 10 MG: 10 TABLET ORAL at 08:30

## 2021-11-08 RX ADMIN — SODIUM CHLORIDE, PRESERVATIVE FREE 10 ML: 5 INJECTION INTRAVENOUS at 08:30

## 2021-11-08 NOTE — PROGRESS NOTES
Infectious Diseases Progress Note      LOS: 2 days   No care team member to display    Chief Complaint: Painful skin lesions    Subjective     The patient had no fever during the last 24 hours.  The patient remained hemodynamically stable.  The patient denied having any new complaints today.  The patient is s/p bedside debridement of left thigh abscess yesterday by general surgery service    Review of Systems:   Review of Systems   Constitutional: Negative.    HENT: Negative.    Eyes: Negative.    Respiratory: Negative.    Cardiovascular: Negative.    Gastrointestinal: Negative.    Genitourinary: Negative.    Musculoskeletal: Negative.    Skin:        Multiple skin lesions   Neurological: Negative.    Hematological: Negative.    Psychiatric/Behavioral: Negative.         Objective     Vital Signs  Temp:  [98.6 °F (37 °C)-98.8 °F (37.1 °C)] 98.6 °F (37 °C)  Heart Rate:  [62-82] 82  Resp:  [20] 20  BP: (129-132)/(76-81) 130/81    Physical Exam:  Physical Exam  Vitals and nursing note reviewed.   Constitutional:       Appearance: He is well-developed.   HENT:      Head: Normocephalic and atraumatic.   Eyes:      Pupils: Pupils are equal, round, and reactive to light.   Cardiovascular:      Rate and Rhythm: Normal rate and regular rhythm.      Heart sounds: Normal heart sounds.   Pulmonary:      Effort: Pulmonary effort is normal. No respiratory distress.      Breath sounds: Normal breath sounds. No wheezing or rales.   Abdominal:      General: Bowel sounds are normal. There is no distension.      Palpations: Abdomen is soft. There is no mass.      Tenderness: There is no abdominal tenderness. There is no guarding or rebound.   Musculoskeletal:         General: No deformity. Normal range of motion.      Cervical back: Normal range of motion and neck supple.   Skin:     Findings: No erythema or rash.      Comments: Multiple skin lesions with lesions ranging from folliculitis to medium size abscess.  The worst one is on  the medial aspect of the left thigh and the left wrist.   Neurological:      Mental Status: He is alert and oriented to person, place, and time.      Cranial Nerves: No cranial nerve deficit.          Results Review:    I have reviewed all clinical data, test, lab, and imaging results.     Radiology  No Radiology Exams Resulted Within Past 24 Hours    Cardiology    Laboratory    Results from last 7 days   Lab Units 11/08/21 0345 11/07/21 0324 11/05/21 2249   WBC 10*3/mm3 4.80 4.50 4.80   HEMOGLOBIN g/dL 10.8* 10.9* 11.6*   HEMATOCRIT % 32.3* 32.5* 35.3*   PLATELETS 10*3/mm3 216 232 214     Results from last 7 days   Lab Units 11/08/21 0345 11/07/21 0324 11/05/21 2249   SODIUM mmol/L 145 138 133*   POTASSIUM mmol/L 4.0 3.8 4.0   CHLORIDE mmol/L 110* 105 100   CO2 mmol/L 23.0 22.0 23.0   BUN mg/dL 9 9 15   CREATININE mg/dL 0.92 0.87 0.99   GLUCOSE mg/dL 77 108* 115*   ALBUMIN g/dL 2.90* 2.90* 2.90*   BILIRUBIN mg/dL 0.2 0.2 0.2   ALK PHOS U/L 54 59 63   AST (SGOT) U/L 15 14 22   ALT (SGPT) U/L 13 12 13   CALCIUM mg/dL 8.4* 8.0* 8.1*     Results from last 7 days   Lab Units 11/08/21  0345   CK TOTAL U/L 13*     Results from last 7 days   Lab Units 11/05/21 2249   SED RATE mm/hr 74*         Microbiology   Microbiology Results (last 10 days)     Procedure Component Value - Date/Time    Wound Culture - Wound, Leg, Left [[id number]] Collected: 11/07/21 1710    Lab Status: Preliminary result Specimen: Wound from Leg, Left Updated: 11/08/21 1158     Wound Culture Growth present, too young to evaluate     Gram Stain Few (2+) WBCs per low power field      Few (2+) Gram positive cocci in pairs and clusters    Blood Culture - Blood, Arm, Left [[id number]]  (Normal) Collected: 11/05/21 2249    Lab Status: Preliminary result Specimen: Blood from Arm, Left Updated: 11/07/21 2200     Blood Culture No growth at 2 days    Blood Culture - Blood, Arm, Right [[id number]]  (Normal) Collected: 11/05/21 2249    Lab Status: Preliminary  result Specimen: Blood from Arm, Right Updated: 11/07/21 2200     Blood Culture No growth at 2 days          Medication Review:       Schedule Meds  ARIPiprazole, 10 mg, Oral, Daily  busPIRone, 30 mg, Oral, Q12H  DAPTOmycin, 550 mg, Intravenous, Q24H  Darunavir-Cobicistat, 1 tablet, Oral, Daily  Emtricitabine-Tenofovir AF, 1 tablet, Oral, Daily  sodium chloride, 10 mL, Intravenous, Q12H  vitamin D, 50,000 Units, Oral, Weekly        Infusion Meds       PRN Meds  •  acetaminophen  •  HYDROcodone-acetaminophen  •  ondansetron  •  [COMPLETED] Insert peripheral IV **AND** sodium chloride  •  sodium chloride        Assessment/Plan       Antimicrobial Therapy   1.  IV daptomycin        2.        3.        4.        5.              Assessment     Recurrent skin and soft tissue infection with patient having multiple lesions ranging from folliculitis to small size abscesses.  Most likely caused by MRSA     Recent hospital admission for MRSA bacteremia secondary to above.  Patient was treated with short course of IV antibiotics and was switched to p.o. doxycycline  Patient denied history of IV drug use     HIV infection.  Status is unknown since were not able to have access to the HIV clinic records.  The patient apparently on highly active antiretroviral therapy    History of IV drug use, using methamphetamine.  Stated that he quit in July 2021     Plan     Continue daptomycin 6 mg/kg IV daily for 4 weeks, 26 more days  The case was discussed with cardiology service and there was no need for transesophageal echo since the transthoracic echo quality was very good and there was no signs of vegetation on the heart valves.  Blood cultures were negative during this hospital admission  HIV viral load and CD4 count, pending  We will try to obtain records from the HIV clinic unsuccessfully  The patient will need weekly labs including CBC, CMP and CPK for 4 weeks  The patient will come to the ambulatory care clinic for daily IV access  since he has history of IV drug use.  The patient can be discharged home today  The order for daptomycin, weekly labs was placed in.    Okay to discharge home today     Continue Darunavir-Cobicistat 1 tablet p.o. daily and  Emtricitabine-Tenofovir 1 tablet p.o. daily  The patient will need to follow with the HIV clinic as scheduled         Pau Mcintyre MD  11/08/21  15:47 EST    Note is dictated utilizing voice recognition software/Dragon

## 2021-11-08 NOTE — CASE MANAGEMENT/SOCIAL WORK
Discharge Planning Assessment  [facility]     Patient Name: Wong Funez  MRN: [id number]  Today's Date: 11/8/2021    Admit Date: 11/5/2021     Discharge Needs Assessment     Row Name 11/08/21 1340       Living Environment    Lives With parent(s)    Current Living Arrangements home/apartment/condo    Primary Care Provided by self    Provides Primary Care For no one    Family Caregiver if Needed parent(s)    Quality of Family Relationships helpful    Able to Return to Prior Arrangements yes       Resource/Environmental Concerns    Resource/Environmental Concerns none    Transportation Concerns car, none       Transition Planning    Patient/Family Anticipates Transition to home with family    Patient/Family Anticipated Services at Transition     Transportation Anticipated family or friend will provide       Discharge Needs Assessment    Readmission Within the Last 30 Days other (see comments)  previous admit 10/6    Equipment Currently Used at Home none    Concerns to be Addressed discharge planning; denies needs/concerns at this time    Anticipated Changes Related to Illness none    Equipment Needed After Discharge none    Provided Post Acute Provider List? N/A    Current Discharge Risk substance use/abuse  informed Kaden ESPARZA of substance abuse history               Discharge Plan     Row Name 11/08/21 4963       Plan    Plan Anticipate routine home. ID consulted. Hx IVDA.    Patient/Family in Agreement with Plan yes    Plan Comments Met with patient at bedside, reports he lives at home with parents. I with ADLs, still drives. Friend to transport home on d/c. PCP and pharmacy confirmed. No issues affording food or medications. Discussed potential need for IV abx on d/c. Patient would need daily sticks through Ambulatory Care due to IVDA history. Informed Kaden ESPARZA. Will follow.                Expected Discharge Date and Time     Expected Discharge Date Expected Discharge Time    Nov 10, 2021           Demographic Summary     Row Name 11/08/21 1340       General Information    Admission Type inpatient    Arrived From emergency department    Referral Source admission list    Reason for Consult discharge planning    Preferred Language English               Functional Status     Row Name 11/08/21 1340       Functional Status    Usual Activity Tolerance good    Current Activity Tolerance good       Functional Status, IADL    Medications independent    Meal Preparation independent    Housekeeping independent    Laundry independent    Shopping independent              Met with patient in room wearing PPE: mask     Maintained distance greater than six feet and spent less than 15 minutes in the room.          Rupinder Hernandez RN

## 2021-11-08 NOTE — PROGRESS NOTES
[facility] Daily Progress Note    Patient Name: Wong Funez  : 1985  MRN: [id number]  Primary Care Physician:  No primary care provider on file.  Date of admission: 2021      Subjective      Chief Complaint: Painful skin lesions      Patient reports he is feeling much better today after incision and drainage of leg abscess by general surgery.  No chest pain no shortness of breath.  Following up cultures from I&D.  Antibiotics being managed per infectious disease.  Awaiting patient's medical records from HIV clinic.    Review of Systems   Constitutional: Positive for malaise/fatigue. Negative for chills and fever.   HENT: Negative for hoarse voice and stridor.    Eyes: Negative for double vision and photophobia. Eye pain: .tmfnormalphys.   Cardiovascular: Negative for chest pain and palpitations.   Respiratory: Negative for cough and shortness of breath.    Skin: Positive for rash. Negative for itching.   Musculoskeletal: Negative for falls and stiffness.   Gastrointestinal: Negative for nausea and vomiting.   Genitourinary: Negative for dysuria and flank pain.   Neurological: Negative for dizziness and seizures.   Psychiatric/Behavioral: Positive for substance abuse. Negative for altered mental status.        Objective      Vitals:   Temp:  [98.6 °F (37 °C)-98.8 °F (37.1 °C)] 98.6 °F (37 °C)  Heart Rate:  [62-82] 82  Resp:  [20] 20  BP: (129-132)/(76-81) 130/81    Physical Exam     General: Male lying in bed breathing comfortably on room air no acute distress  HEENT: NC/AT, EOMI, mucosa moist  Heart: Regular, rate controlled  Chest: Normal work of breathing, moving air well no wheezing  Abdominal: Soft. NT/ND.   Musculoskeletal: Normal ROM.  No edema. No calf tenderness.  Neurological: AAOx3, no focal deficits  Skin: Skin is warm and dry.  Pustular lesions on upper and lower extremities, left thigh lesion covered  Psychiatric: Normal mood and  "affect.         Result Review    Result Review:  I have personally reviewed the results from the time of this admission to 11/8/2021 12:31 EST and agree with these findings:  [x]  Laboratory  [x]  Microbiology  [x]  Radiology  [x]  EKG/Telemetry   []  Cardiology/Vascular   []  Pathology  []  Old records  []  Other:  Most notable findings include: Anemia          Assessment/Plan      Brief Patient Summary:    36-year-old  male with history of HIV infection on antiretroviral therapy, MRSA bacteremia with right leg cellulitis and abscess last month, bipolar disorder, and ADHD, presented to the ED complaining of appearance of generalized painful red raised skin lesions, which the patient says is not uncommon for him to get pimple-like areas that heal on their own.  He states that the lesion started to appear last Monday, and were getting progressively worse and more painful.  He denies having fever or chills, chest pain or shortness of breath, nausea vomiting abdominal pain or any other complaint.  He does report history of IV drug abuse in the past and the last reported use was on the summer 2021.     Emergency department course:  Afebrile, tachycardic, no acute distress.  ED NP stated in her physical examination the presence of \"abscess, erythema, lesion, and wounds \".  Labs were significant for CRP 8.05, sed rate 74 and hemoglobin 11.6.  Patient was given 2000 mg IV vancomycin and 500 cc bolus of normal saline.    11/6/2021; patient has a lot of skin lesions likely multiple abscesses patient picks on these lesions, they usually start out as a pimple and then the patient picks on it currently has multiple abscesses otherwise hemodynamically stable, await ID consult.    11/7/2021; patient wanted to go home, very emotional, otherwise hemodynamically stable, patient needs long-term IV antibiotics as per ID for possible DENYS tomorrow.  No new symptoms endorsed, spoke with the bedside RN.    ARIPiprazole, 10 mg, " Oral, Daily  busPIRone, 30 mg, Oral, Q12H  DAPTOmycin, 550 mg, Intravenous, Q24H  Darunavir-Cobicistat, 1 tablet, Oral, Daily  Emtricitabine-Tenofovir AF, 1 tablet, Oral, Daily  sodium chloride, 10 mL, Intravenous, Q12H  vitamin D, 50,000 Units, Oral, Weekly             Active Hospital Problems:  Active Hospital Problems    Diagnosis    • **Generalized skin lesions      Infected papulovesicular lesions.     • Cellulitis      Plan:     Painful skin lesions-patient with history of MRSA infection along with immunocompromise state  -ID consulted  -Daptomycin per ID, check CK regularly current normal  -For large left thigh abscess General surgery consulted underwent bedside I&D  -Fluid from abscess sent to culture  -Pain control    Hyperglycemia-likely secondary to underlying infection and sympathetic nervous system activation  -A1c normal  -Continue monitor daily    Anemia-mild in nature likely associated with chronic underlying inflammatory state as well as medication use  -Monitor daily    HIV infection-patient reported on antiretroviral therapy  -Follow-up viral load and CD4 count  -ID consulted  -Antiretroviral therapy per ID  -Obtain outside records    Bipolar disorder/ADHD-on chronic psychotropic medication  -Continue    Methamphetamine abuse-history of IV use  -Patient reports cessation within this last year  -Records indicating patient is on Adderall which seems to indicate further potentiation of risky behavior    DVT prophylaxis:  Mechanical DVT prophylaxis orders are present.    CODE STATUS:    Code Status (Patient has no pulse and is not breathing): CPR (Attempt to Resuscitate)  Medical Interventions (Patient has pulse or is breathing): Full Support      Disposition:  I expect patient to be discharged in 1 to 3 days.    This patient has been examined wearing appropriate Personal Protective Equipment and discussed with hospital infection control department. 11/08/21      Electronically signed by Jp  Isaac Ocampo MD, 11/08/21, 12:31 EST.  [facility]ist Team

## 2021-11-08 NOTE — PLAN OF CARE
Goal Outcome Evaluation:  Plan of Care Reviewed With: patient            Pt c/o generalized pain 5/10 relieved with prn norco. Pt has been very lethargic and has been asleep all shift. Wound culture pending. DENYS canceled per cardiology. Will continue to monitor...

## 2021-11-08 NOTE — PLAN OF CARE
Problem: Adult Inpatient Plan of Care  Goal: Plan of Care Review  Outcome: Ongoing, Progressing  Flowsheets (Taken 11/8/2021 1113)  Plan of Care Reviewed With: patient  Goal: Patient-Specific Goal (Individualized)  Outcome: Ongoing, Progressing  Goal: Absence of Hospital-Acquired Illness or Injury  Outcome: Ongoing, Progressing  Intervention: Identify and Manage Fall Risk  Recent Flowsheet Documentation  Taken 11/8/2021 1109 by Audra Virk RN  Safety Promotion/Fall Prevention:   safety round/check completed   nonskid shoes/slippers when out of bed  Taken 11/8/2021 0903 by Audra Virk RN  Safety Promotion/Fall Prevention:   safety round/check completed   nonskid shoes/slippers when out of bed  Taken 11/8/2021 0730 by Audra Virk RN  Safety Promotion/Fall Prevention:   safety round/check completed   nonskid shoes/slippers when out of bed  Intervention: Prevent Skin Injury  Recent Flowsheet Documentation  Taken 11/8/2021 0730 by Audra Virk RN  Body Position: position maintained  Intervention: Prevent Infection  Recent Flowsheet Documentation  Taken 11/8/2021 1109 by Audra Virk RN  Infection Prevention:   single patient room provided   rest/sleep promoted   hand hygiene promoted  Taken 11/8/2021 0903 by Audra Virk RN  Infection Prevention:   single patient room provided   rest/sleep promoted   hand hygiene promoted  Taken 11/8/2021 0730 by Audra iVrk RN  Infection Prevention:   single patient room provided   rest/sleep promoted   hand hygiene promoted  Goal: Optimal Comfort and Wellbeing  Outcome: Ongoing, Progressing  Intervention: Provide Person-Centered Care  Recent Flowsheet Documentation  Taken 11/8/2021 0730 by Audra Virk RN  Trust Relationship/Rapport:   care explained   thoughts/feelings acknowledged  Goal: Readiness for Transition of Care  Outcome: Ongoing, Progressing     Problem: Skin or Soft Tissue Infection  Goal: Infection Symptom  Resolution  Outcome: Ongoing, Progressing  Intervention: Provide Meticulous Infection Site Care  Recent Flowsheet Documentation  Taken 11/8/2021 1109 by Audra Virk RN  Infection Prevention:   single patient room provided   rest/sleep promoted   hand hygiene promoted  Taken 11/8/2021 0903 by Audra Virk RN  Infection Prevention:   single patient room provided   rest/sleep promoted   hand hygiene promoted  Taken 11/8/2021 0730 by Audra Virk RN  Infection Prevention:   single patient room provided   rest/sleep promoted   hand hygiene promoted  Intervention: Minimize and Manage Infection Progression  Recent Flowsheet Documentation  Taken 11/8/2021 1109 by Audra Virk RN  Isolation Precautions: contact precautions maintained  Taken 11/8/2021 0903 by Audra Virk RN  Isolation Precautions: contact precautions maintained  Taken 11/8/2021 0730 by Audra Virk RN  Isolation Precautions: contact precautions maintained   Goal Outcome Evaluation:  Plan of Care Reviewed With: patient      Patient rested throughout the shift with no complaints. Wound Cx are pending. Will continue to observe.

## 2021-11-08 NOTE — PROGRESS NOTES
Cardiology Progress Note    Patient Identification:  Name: Wong Funez  Age: 36 y.o.  Sex: male  :  1985  MRN: [id number]                 Follow Up / Chief Complaint: MRSA bacteremia, cellulitis, rule out endocarditis  Chief Complaint   Patient presents with   • Rash       Interval History: Patient presented with cellulitis and MRSA bacteremia.       Subjective: Patient seen and examined.  Chart reviewed.  Labs reviewed.  Discussed with RN taking care of patient.      Objective: Hemoglobin is 10.8.      History of Present Illness:       This is a 36-year-old with PMH of     HIV infection on antiretroviral therapy  ADHD, bipolar disorder, anxiety, depression  NKDA  Family history of arthritis in mother and COPD in father  History of drug abuse including meth and IVDA        Presented with cellulitis was found to have MRSA bacteremia therefore cardiology is consulted to do DENYS rule out endocarditis. Patient was recently in the hospital with MRSA infection was treated with antibiotics and switched to p.o. antibiotics and home comes in with worsening lesions.  Patient had an echocardiogram 10/6/2021 which showed normal LV function and mildly dilated RV.  Labs revealed normal CMP. Hemoglobin 10.9 with MCV of 86.8. Hepatitis profiles are negative.  Blood cultures from 10/3/2021 show MRSA.        Assessment:  :     MRSA bacteremia  Hyperglycemia, hypocalcemia, hyponatremia, anemia, HIV        Recommendations / Plan:         Patient was recently in the hospital had MRSA bacteremia. Was treated with a short course of IV antibiotics switched to p.o. doxycycline and now presents with multiple skin lesions. Patient has previous history of drug abuse.   Patient underwent echocardiogram 2021 where the valves are well visualized and there was no vegetation seen.  No regurgitation seen.  Counseled patient on drug abuse cessation  Discussed with ID physician .  Please call me back if I can be of any  "further assistance.             Past Medical History:  Past Medical History:   Diagnosis Date   • ADHD 2019   • Anxiety 2019   • Bipolar 1 disorder (HCC) 2019   • Cellulitis of right leg     With abscess   • Depression 2019   • HIV (human immunodeficiency virus infection) (HCC)    • MRSA bacteremia      Past Surgical History:  History reviewed. No pertinent surgical history.     Social History:   Social History     Tobacco Use   • Smoking status: Light Tobacco Smoker     Packs/day: 0.00     Years: 0.00     Pack years: 0.00   • Smokeless tobacco: Never Used   • Tobacco comment: Have been around it in social settings   Substance Use Topics   • Alcohol use: Not Currently     Alcohol/week: 0.0 standard drinks     Comment: socially      Family History:  Family History   Problem Relation Age of Onset   • Arthritis Mother    • Depression Mother    • Diabetes Mother    • COPD Father    • Depression Father    • Cancer Maternal Grandmother    • COPD Paternal Grandmother    • Depression Maternal Aunt    • Diabetes Maternal Aunt           Allergies:  No Known Allergies  Scheduled Meds:  ARIPiprazole, 10 mg, Daily  busPIRone, 30 mg, Q12H  DAPTOmycin, 550 mg, Q24H  Darunavir-Cobicistat, 1 tablet, Daily  Emtricitabine-Tenofovir AF, 1 tablet, Daily  sodium chloride, 10 mL, Q12H  vitamin D, 50,000 Units, Weekly          Review of Systems:   ROS  Review of Systems   Constitution: Negative for chills and fever.   Cardiovascular: Negative for chest pain and palpitations.   Respiratory: Negative for cough and hemoptysis.    Gastrointestinal: Negative for nausea.        Constitutional:  Temp:  [98.6 °F (37 °C)-98.8 °F (37.1 °C)] 98.6 °F (37 °C)  Heart Rate:  [62-82] 82  Resp:  [20] 20  BP: (129-132)/(76-81) 130/81    Physical Exam   /81 (BP Location: Left arm, Patient Position: Lying)   Pulse 82   Temp 98.6 °F (37 °C) (Oral)   Resp 20   Ht 180.3 cm (71\")   Wt 86.9 kg (191 lb 9.3 oz)   SpO2 97%   BMI 26.72 kg/m²   General:  " Appears in no acute distress  Eyes: Sclera is anicteric,  conjunctiva is clear   HEENT:  No JVD. Thyroid not visibly enlarged. No mucosal pallor or cyanosis  Respiratory: Respirations regular and unlabored at rest.  Bilaterally good breath sounds, with good air entry in all fields. No crackles, rubs or wheezes auscultated  Cardiovascular: S1,S2 Regular rate and rhythm. No murmur, rub or gallop auscultated.  . No pretibial pitting edema  Gastrointestinal: Abdomen nondistended, soft  Musculoskeletal:  No abnormal movements  Extremities: No digital clubbing or cyanosis  Skin: Color pink. Skin warm and dry to touch. No rashes  No xanthoma  Neuro: Alert and awake, no lateralizing deficits appreciated    INTAKE AND OUTPUT:    Intake/Output Summary (Last 24 hours) at 11/8/2021 1249  Last data filed at 11/8/2021 1000  Gross per 24 hour   Intake 600 ml   Output --   Net 600 ml       Cardiographics  Telemetry: Sinus rhythm    ECG:   No orders to display     I have personally reviewed EKG    Echocardiogram: Results for orders placed during the hospital encounter of 11/05/21    Adult Transthoracic Echo Limited W/ Cont if Necessary Per Protocol    Interpretation Summary  Normal LV size and contractility EF of 60 to 65%  Normal RV size  Normal atrial size  Aortic valve, mitral valve, tricuspid valve appears structurally normal, no significant regurgitation seen.  No vegetation seen  No pericardial effusion seen.  Proximal aorta appears normal in size.      Lab Review   I have reviewed the labs  Results from last 7 days   Lab Units 11/08/21  0345 11/07/21  0952   CK TOTAL U/L 13* 15*         Results from last 7 days   Lab Units 11/08/21  0345   SODIUM mmol/L 145   POTASSIUM mmol/L 4.0   BUN mg/dL 9   CREATININE mg/dL 0.92   CALCIUM mg/dL 8.4*         Results from last 7 days   Lab Units 11/08/21  0345 11/07/21  0324 11/05/21  2249   WBC 10*3/mm3 4.80 4.50 4.80   HEMOGLOBIN g/dL 10.8* 10.9* 11.6*   HEMATOCRIT % 32.3* 32.5* 35.3*  "  PLATELETS 10*3/mm3 216 232 214           RADIOLOGY:  Imaging Results (Last 24 Hours)     ** No results found for the last 24 hours. **                )11/8/2021  MD KEN Huff Dragon/Transcription:   \"Dictated utilizing Dragon dictation\".   "

## 2021-11-09 ENCOUNTER — READMISSION MANAGEMENT (OUTPATIENT)
Dept: CALL CENTER | Facility: HOSPITAL | Age: 36
End: 2021-11-09

## 2021-11-09 VITALS
WEIGHT: 194.2 LBS | BODY MASS INDEX: 27.19 KG/M2 | OXYGEN SATURATION: 98 % | HEART RATE: 69 BPM | DIASTOLIC BLOOD PRESSURE: 82 MMHG | TEMPERATURE: 98.1 F | RESPIRATION RATE: 14 BRPM | SYSTOLIC BLOOD PRESSURE: 134 MMHG | HEIGHT: 71 IN

## 2021-11-09 DIAGNOSIS — B95.62 MRSA BACTEREMIA: ICD-10-CM

## 2021-11-09 DIAGNOSIS — L02.415 CELLULITIS AND ABSCESS OF RIGHT LOWER EXTREMITY: Primary | ICD-10-CM

## 2021-11-09 DIAGNOSIS — L03.115 CELLULITIS AND ABSCESS OF RIGHT LOWER EXTREMITY: Primary | ICD-10-CM

## 2021-11-09 DIAGNOSIS — R78.81 MRSA BACTEREMIA: ICD-10-CM

## 2021-11-09 LAB
ANION GAP SERPL CALCULATED.3IONS-SCNC: 13 MMOL/L (ref 5–15)
ANISOCYTOSIS BLD QL: ABNORMAL
BUN SERPL-MCNC: 12 MG/DL (ref 6–20)
BUN/CREAT SERPL: 12.4 (ref 7–25)
CALCIUM SPEC-SCNC: 8.1 MG/DL (ref 8.6–10.5)
CHLORIDE SERPL-SCNC: 105 MMOL/L (ref 98–107)
CO2 SERPL-SCNC: 22 MMOL/L (ref 22–29)
CREAT SERPL-MCNC: 0.97 MG/DL (ref 0.76–1.27)
DEPRECATED RDW RBC AUTO: 49 FL (ref 37–54)
ERYTHROCYTE [DISTWIDTH] IN BLOOD BY AUTOMATED COUNT: 16.1 % (ref 12.3–15.4)
GFR SERPL CREATININE-BSD FRML MDRD: 89 ML/MIN/1.73
GLUCOSE SERPL-MCNC: 110 MG/DL (ref 65–99)
HCT VFR BLD AUTO: 31.5 % (ref 37.5–51)
HGB BLD-MCNC: 10.5 G/DL (ref 13–17.7)
LYMPHOCYTES # BLD MANUAL: 1.12 10*3/MM3 (ref 0.7–3.1)
LYMPHOCYTES NFR BLD MANUAL: 25 % (ref 19.6–45.3)
LYMPHOCYTES NFR BLD MANUAL: 8 % (ref 5–12)
MCH RBC QN AUTO: 29.4 PG (ref 26.6–33)
MCHC RBC AUTO-ENTMCNC: 33.4 G/DL (ref 31.5–35.7)
MCV RBC AUTO: 87.9 FL (ref 79–97)
MONOCYTES # BLD AUTO: 0.34 10*3/MM3 (ref 0.1–0.9)
NEUTROPHILS # BLD AUTO: 2.84 10*3/MM3 (ref 1.7–7)
NEUTROPHILS NFR BLD MANUAL: 53 % (ref 42.7–76)
NEUTS BAND NFR BLD MANUAL: 13 % (ref 0–5)
PLAT MORPH BLD: NORMAL
PLATELET # BLD AUTO: 229 10*3/MM3 (ref 140–450)
PMV BLD AUTO: 7.6 FL (ref 6–12)
POTASSIUM SERPL-SCNC: 3.5 MMOL/L (ref 3.5–5.2)
RBC # BLD AUTO: 3.58 10*6/MM3 (ref 4.14–5.8)
SCAN SLIDE: NORMAL
SODIUM SERPL-SCNC: 140 MMOL/L (ref 136–145)
VARIANT LYMPHS NFR BLD MANUAL: 1 % (ref 0–5)
WBC # BLD AUTO: 4.3 10*3/MM3 (ref 3.4–10.8)
WBC MORPH BLD: NORMAL

## 2021-11-09 PROCEDURE — 85025 COMPLETE CBC W/AUTO DIFF WBC: CPT | Performed by: FAMILY MEDICINE

## 2021-11-09 PROCEDURE — 99239 HOSP IP/OBS DSCHRG MGMT >30: CPT | Performed by: FAMILY MEDICINE

## 2021-11-09 PROCEDURE — 85007 BL SMEAR W/DIFF WBC COUNT: CPT | Performed by: FAMILY MEDICINE

## 2021-11-09 PROCEDURE — 99231 SBSQ HOSP IP/OBS SF/LOW 25: CPT | Performed by: NURSE PRACTITIONER

## 2021-11-09 PROCEDURE — 80048 BASIC METABOLIC PNL TOTAL CA: CPT | Performed by: FAMILY MEDICINE

## 2021-11-09 PROCEDURE — 25010000002 DAPTOMYCIN PER 1 MG: Performed by: INTERNAL MEDICINE

## 2021-11-09 RX ADMIN — BUSPIRONE HYDROCHLORIDE 30 MG: 15 TABLET ORAL at 08:29

## 2021-11-09 RX ADMIN — ARIPIPRAZOLE 10 MG: 10 TABLET ORAL at 08:29

## 2021-11-09 RX ADMIN — DARUNAVIR ETHANOLATE AND COBICISTAT 1 TABLET: 800; 150 TABLET, FILM COATED ORAL at 08:29

## 2021-11-09 RX ADMIN — DAPTOMYCIN 550 MG: 500 INJECTION, POWDER, LYOPHILIZED, FOR SOLUTION INTRAVENOUS at 13:03

## 2021-11-09 RX ADMIN — SODIUM CHLORIDE, PRESERVATIVE FREE 10 ML: 5 INJECTION INTRAVENOUS at 08:29

## 2021-11-09 RX ADMIN — EMTRICITABINE AND TENOFOVIR ALAFENAMIDE 1 TABLET: 200; 25 TABLET ORAL at 08:29

## 2021-11-09 NOTE — PLAN OF CARE
Problem: Adult Inpatient Plan of Care  Goal: Plan of Care Review  Outcome: Ongoing, Progressing  Flowsheets (Taken 11/9/2021 0929)  Plan of Care Reviewed With: patient  Goal: Patient-Specific Goal (Individualized)  Outcome: Ongoing, Progressing  Goal: Absence of Hospital-Acquired Illness or Injury  Outcome: Ongoing, Progressing  Intervention: Identify and Manage Fall Risk  Recent Flowsheet Documentation  Taken 11/9/2021 0906 by Audra Virk RN  Safety Promotion/Fall Prevention:   safety round/check completed   nonskid shoes/slippers when out of bed  Taken 11/9/2021 0735 by Audra Virk RN  Safety Promotion/Fall Prevention:   safety round/check completed   nonskid shoes/slippers when out of bed  Intervention: Prevent Skin Injury  Recent Flowsheet Documentation  Taken 11/9/2021 0735 by Audra Virk RN  Body Position: position maintained  Intervention: Prevent Infection  Recent Flowsheet Documentation  Taken 11/9/2021 0906 by Audra Virk RN  Infection Prevention:   single patient room provided   rest/sleep promoted   hand hygiene promoted  Taken 11/9/2021 0735 by Audra Virk RN  Infection Prevention:   single patient room provided   rest/sleep promoted   hand hygiene promoted  Goal: Optimal Comfort and Wellbeing  Outcome: Ongoing, Progressing  Intervention: Provide Person-Centered Care  Recent Flowsheet Documentation  Taken 11/9/2021 0735 by Audra Virk RN  Trust Relationship/Rapport:   care explained   thoughts/feelings acknowledged  Goal: Readiness for Transition of Care  Outcome: Ongoing, Progressing     Problem: Skin or Soft Tissue Infection  Goal: Infection Symptom Resolution  Outcome: Ongoing, Progressing  Intervention: Provide Meticulous Infection Site Care  Recent Flowsheet Documentation  Taken 11/9/2021 0906 by Audra Virk RN  Infection Prevention:   single patient room provided   rest/sleep promoted   hand hygiene promoted  Taken 11/9/2021 0735 by  Audra Virk, RN  Infection Prevention:   single patient room provided   rest/sleep promoted   hand hygiene promoted  Intervention: Minimize and Manage Infection Progression  Recent Flowsheet Documentation  Taken 11/9/2021 0906 by Audra Virk, RN  Isolation Precautions: contact precautions maintained  Taken 11/9/2021 0735 by Audra Virk, RN  Isolation Precautions: contact precautions maintained   Goal Outcome Evaluation:  Plan of Care Reviewed With: patient         Patient rested throughout the shift. MD stated okay with giving antibiotic early for discharge this afternoon. Will continue to observe.

## 2021-11-09 NOTE — CASE MANAGEMENT/SOCIAL WORK
Continued Stay Note  [facility]     Patient Name: Wong Funez  MRN: [id number]  Today's Date: 11/9/2021    Admit Date: 11/5/2021     Discharge Plan     Row Name 11/09/21 1452       Plan    Final Discharge Disposition Code 01 - home or self-care    Final Note Home with OP IV abx at [facility]                   Phone communication or documentation only - no physical contact with patient or family.      Rupinder Hernandez RN

## 2021-11-09 NOTE — CASE MANAGEMENT/SOCIAL WORK
Continued Stay Note  [facility]     Patient Name: Wong Funez  MRN: [id number]  Today's Date: 11/9/2021    Admit Date: 11/5/2021     Discharge Plan     Row Name 11/09/21 0746       Plan    Plan Home with IV abx through [facility], orders placed.    Plan Comments Orders confirmed with IV abx through [facility]. Called and spoke to [facility], provided patient information. Informed patient would be a daily stick due to hx IVDA and that patient would start tomorrow. They will call patient on room phone and do meet and greet. Updated RN via secure chat.              Phone communication or documentation only - no physical contact with patient or family.      Rupinder Hernandez RN

## 2021-11-09 NOTE — CASE MANAGEMENT/SOCIAL WORK
Continued Stay Note  [facility]     Patient Name: Wong Funez  MRN: [id number]  Today's Date: 11/9/2021    Admit Date: 11/5/2021     Discharge Plan     Row Name 11/09/21 1448       Plan    Plan Comments Spoke with patient at bedside.  Home with abx through [facility] ambulatory care.  Spoke with patient about Ambulatory care calling him to let him know about his care at [facility]. Patient stated they already called him. Verbalized understanding  for [facility].                       Expected Discharge Date and Time     Expected Discharge Date Expected Discharge Time    Nov 9, 2021             Ana M Lal RN   Met with patient in room wearing PPE: mask, Maintained distance greater than six feet and spent less than 15 minutes in the room.

## 2021-11-09 NOTE — PROGRESS NOTES
Cardiology Progress Note    Patient Identification:  Name: Wong Funez  Age: 36 y.o.  Sex: male  :  1985  MRN: [id number]                 Follow Up / Chief Complaint: MRSA bacteremia, cellulitis, rule out endocarditis  Chief Complaint   Patient presents with   • Rash       Interval History: Patient presented with cellulitis and MRSA bacteremia.       Subjective: Patient seen and examined.  Chart reviewed.  Labs reviewed.  Discussed with RN taking care of patient.  No chest pain or shortness of breath; states lesions are improving     Objective: Hemoglobin is 10.5, glucose 110 K 3.5      History of Present Illness:       This is a 36-year-old with PMH of     HIV infection on antiretroviral therapy  ADHD, bipolar disorder, anxiety, depression  NKDA  Family history of arthritis in mother and COPD in father  History of drug abuse including meth and IVDA        Presented with cellulitis was found to have MRSA bacteremia therefore cardiology is consulted to do DENYS rule out endocarditis. Patient was recently in the hospital with MRSA infection was treated with antibiotics and switched to p.o. antibiotics and home comes in with worsening lesions.  Patient had an echocardiogram 10/6/2021 which showed normal LV function and mildly dilated RV.  Labs revealed normal CMP. Hemoglobin 10.9 with MCV of 86.8. Hepatitis profiles are negative.  Blood cultures from 10/3/2021 show MRSA.        Assessment:  :     MRSA bacteremia  Hyperglycemia, hypocalcemia, hyponatremia, anemia, HIV        Recommendations / Plan:         Patient was recently in the hospital had MRSA bacteremia. Was treated with a short course of IV antibiotics switched to p.o. doxycycline and now presents with multiple skin lesions. Patient has previous history of drug abuse.   Patient underwent echocardiogram 2021 where the valves are well visualized and there was no vegetation seen.  No regurgitation seen.  No need for DENYS at this time.  Continue  current abx treatment per ID   Needs cessation of drug abuse   Dr. Alvarado discussed with ID physician .                 Past Medical History:  Past Medical History:   Diagnosis Date   • ADHD 2019   • Anxiety 2019   • Bipolar 1 disorder (HCC) 2019   • Cellulitis of right leg     With abscess   • Depression 2019   • HIV (human immunodeficiency virus infection) (HCC)    • MRSA bacteremia      Past Surgical History:  History reviewed. No pertinent surgical history.     Social History:   Social History     Tobacco Use   • Smoking status: Light Tobacco Smoker     Packs/day: 0.00     Years: 0.00     Pack years: 0.00   • Smokeless tobacco: Never Used   • Tobacco comment: Have been around it in social settings   Substance Use Topics   • Alcohol use: Not Currently     Alcohol/week: 0.0 standard drinks     Comment: socially      Family History:  Family History   Problem Relation Age of Onset   • Arthritis Mother    • Depression Mother    • Diabetes Mother    • COPD Father    • Depression Father    • Cancer Maternal Grandmother    • COPD Paternal Grandmother    • Depression Maternal Aunt    • Diabetes Maternal Aunt           Allergies:  No Known Allergies  Scheduled Meds:  ARIPiprazole, 10 mg, Daily  busPIRone, 30 mg, Q12H  DAPTOmycin, 550 mg, Q24H  Darunavir-Cobicistat, 1 tablet, Daily  Emtricitabine-Tenofovir AF, 1 tablet, Daily  sodium chloride, 10 mL, Q12H  vitamin D, 50,000 Units, Weekly          Review of Systems:   ROS  Review of Systems   Constitution: Negative for chills and fever.   Cardiovascular: Negative for chest pain and palpitations.   Respiratory: Negative for cough and hemoptysis.    Gastrointestinal: Negative for nausea.        Constitutional:  Temp:  [98.1 °F (36.7 °C)-99.1 °F (37.3 °C)] 98.1 °F (36.7 °C)  Heart Rate:  [65-72] 69  Resp:  [14-18] 14  BP: (134-145)/(78-88) 134/82    Physical Exam   /82 (BP Location: Left arm, Patient Position: Lying)   Pulse 69   Temp 98.1 °F (36.7 °C)  "(Oral)   Resp 14   Ht 180.3 cm (71\")   Wt 88.1 kg (194 lb 3.2 oz)   SpO2 98%   BMI 27.09 kg/m²   General:  Appears in no acute distress  Eyes: Sclera is anicteric,  conjunctiva is clear   HEENT:  No JVD. Thyroid not visibly enlarged. No mucosal pallor or cyanosis  Respiratory: Respirations regular and unlabored at rest.  Bilaterally good breath sounds, with good air entry in all fields. No crackles, rubs or wheezes auscultated  Cardiovascular: S1,S2 Regular rate and rhythm. No murmur, rub or gallop auscultated.  . No pretibial pitting edema  Gastrointestinal: Abdomen nondistended, soft  Musculoskeletal:  No abnormal movements  Extremities: No digital clubbing or cyanosis  Skin: Color pink. Skin warm and dry to touch. No rashes  No xanthoma  Neuro: Alert and awake, no lateralizing deficits appreciated    INTAKE AND OUTPUT:    Intake/Output Summary (Last 24 hours) at 11/9/2021 1318  Last data filed at 11/9/2021 0735  Gross per 24 hour   Intake 1440 ml   Output --   Net 1440 ml       Cardiographics  Telemetry: Sinus rhythm    ECG:   No orders to display     I have personally reviewed EKG    Echocardiogram: Results for orders placed during the hospital encounter of 11/05/21    Adult Transthoracic Echo Limited W/ Cont if Necessary Per Protocol    Interpretation Summary  Normal LV size and contractility EF of 60 to 65%  Normal RV size  Normal atrial size  Aortic valve, mitral valve, tricuspid valve appears structurally normal, no significant regurgitation seen.  No vegetation seen  No pericardial effusion seen.  Proximal aorta appears normal in size.      Lab Review   I have reviewed the labs  Results from last 7 days   Lab Units 11/08/21 0345 11/07/21  0952   CK TOTAL U/L 13* 15*         Results from last 7 days   Lab Units 11/09/21 0346   SODIUM mmol/L 140   POTASSIUM mmol/L 3.5   BUN mg/dL 12   CREATININE mg/dL 0.97   CALCIUM mg/dL 8.1*         Results from last 7 days   Lab Units 11/09/21 0346 11/08/21 0345 " "11/07/21  0324   WBC 10*3/mm3 4.30 4.80 4.50   HEMOGLOBIN g/dL 10.5* 10.8* 10.9*   HEMATOCRIT % 31.5* 32.3* 32.5*   PLATELETS 10*3/mm3 229 216 232           RADIOLOGY:  Imaging Results (Last 24 Hours)     ** No results found for the last 24 hours. **                )11/9/2021  ARMEN Davis  Electronically signed by ARMEN Davis, 11/09/21, 1:19 PM EST.      [facility] Dragon/Transcription:   \"Dictated utilizing Dragon dictation\".   "

## 2021-11-09 NOTE — DISCHARGE SUMMARY
[facility]  DISCHARGE SUMMARY    Patient Name: Wong Funez  : 1985  MRN: [id number]    Date of Admission: 2021  Date of Discharge: 2021  Primary Care Physician: No primary care provider on file.      Presenting Problem:   Generalized skin lesions [L98.9]  History of MRSA infection [Z86.14]  Cellulitis, unspecified cellulitis site [L03.90]    Active and Resolved Hospital Problems:  Active Hospital Problems    Diagnosis POA   • **Generalized skin lesions [L98.9] Yes   • Cellulitis [L03.90] Yes      Resolved Hospital Problems   No resolved problems to display.     Painful skin lesions-patient with history of MRSA infection along with immunocompromise state  -ID consulted  -Daptomycin per ID  -For large left thigh abscess General surgery consulted underwent bedside I&D  -Fluid from abscess sent to culture  -Patient reports no pain at this time     Hyperglycemia-likely secondary to underlying infection and sympathetic nervous system activation  -A1c normal  -Continue to monitor     MRSA bacteremia-noted on previous hospital stay.  Due to new worsening soft tissue infection and previously being treated with p.o. doxycycline recommendations to continue IV daptomycin for a total of 4 weeks  -We will need weekly CBC CMP and CPK while getting infusions  -Repeat blood cultures over this hospital stay no growth to date  -TTE showing no signs of vegetation on heart valve    Anemia-mild in nature likely associated with chronic underlying inflammatory state as well as medication use  -Continue to monitor outpatient     HIV infection-patient reported on antiretroviral therapy  -Follow-up viral load and CD4 count  -ID consulted  -Antiretroviral therapy per ID  -Obtain outside records     Bipolar disorder/ADHD-on chronic psychotropic medication  -Continue     Methamphetamine abuse-history of IV use  -Patient reports cessation within this last year  -Records  "indicating patient is on Adderall which seems to indicate further potentiation of risky behavior  -Would consider discontinuing Adderall       Hospital Course     Hospital Course:    36-year-old  male with history of HIV infection on antiretroviral therapy, MRSA bacteremia with right leg cellulitis and abscess last month, bipolar disorder, and ADHD, presented to the ED complaining of appearance of generalized painful red raised skin lesions, which the patient says is not uncommon for him to get pimple-like areas that heal on their own.  He states that the lesion started to appear last Monday, and were getting progressively worse and more painful.  He denies having fever or chills, chest pain or shortness of breath, nausea vomiting abdominal pain or any other complaint.  He does report history of IV drug abuse in the past and the last reported use was on the summer 2021.     Emergency department course:  Afebrile, tachycardic, no acute distress.  ED NP stated in her physical examination the presence of \"abscess, erythema, lesion, and wounds \".  Labs were significant for CRP 8.05, sed rate 74 and hemoglobin 11.6.  Patient was given 2000 mg IV vancomycin and 500 cc bolus of normal saline.     11/6/2021; patient has a lot of skin lesions likely multiple abscesses patient picks on these lesions, they usually start out as a pimple and then the patient picks on it currently has multiple abscesses otherwise hemodynamically stable, await ID consult.     11/7/2021; patient wanted to go home, very emotional, otherwise hemodynamically stable, patient needs long-term IV antibiotics as per ID for possible DENYS tomorrow.  No new symptoms endorsed, spoke with the bedside RN.    11/8/2021:  Patient reports he is feeling much better today after incision and drainage of leg abscess by general surgery.  No chest pain no shortness of breath.  Following up cultures from I&D.  Antibiotics being managed per infectious disease.  " Awaiting patient's medical records from HIV clinic.  Patient set up per ID with ambulatory center for daily IV antibiotic infusion.    11/9/2021:  Patient with no new concerns.  Cleared by ID for discharge.  Continue antiretroviral therapy.  Patient set up for 4 weeks of IV daptomycin with weekly labs.  Continue outpatient wound care.  Follow-up with general surgery as for evaluation of I&D site.  Follow-up with regular HIV clinic for continued treatment and monitoring.  Patient able to be discharged home in good condition with strict return precautions given.      DISCHARGE Follow Up Recommendations for labs and diagnostics:     Weekly CBC, CMP and CPK while getting IV antibiotic infusions      Reasons For Change In Medications and Indications for New Medications:    Daptomycin 550 mg IV every 24 hours for MRSA soft tissue infection and previous MRSA bacteremia    Day of Discharge     Vital Signs:  Temp:  [98.5 °F (36.9 °C)-99.1 °F (37.3 °C)] 98.5 °F (36.9 °C)  Heart Rate:  [65-72] 72  Resp:  [16-18] 18  BP: (135-145)/(78-88) 139/88    Physical Exam:  Physical Exam     General: Male lying in bed breathing comfortably on room air no acute distress  HEENT: NC/AT, EOMI, mucosa moist  Heart: Regular, rate controlled  Chest: Normal work of breathing, moving air well no wheezing  Abdominal: Soft. NT/ND.   Musculoskeletal: Normal ROM.  No edema. No calf tenderness.  Neurological: AAOx3, no focal deficits  Skin: Skin is warm and dry.    Lesions on upper and lower extremities in various stages of healing, left thigh lesion covered dressing clean dry and intact  Psychiatric: Normal mood and affect.      Pertinent  and/or Most Recent Results     LAB RESULTS:      Lab 11/09/21  0346 11/08/21  0345 11/07/21  0324 11/05/21  2249   WBC 4.30 4.80 4.50 4.80   HEMOGLOBIN 10.5* 10.8* 10.9* 11.6*   HEMATOCRIT 31.5* 32.3* 32.5* 35.3*   PLATELETS 229 216 232 214   NEUTROS ABS 2.84 3.50 2.40 3.00   LYMPHS ABS  --   --  1.40 1.10   MONOS  ABS  --   --  0.60 0.70   EOS ABS  --  0.05 0.10 0.00   MCV 87.9 86.9 86.8 86.5   SED RATE  --   --   --  74*   CRP  --   --   --  8.05*         Lab 11/09/21  0346 11/08/21 0345 11/07/21 0324 11/06/21 0918 11/05/21 2249   SODIUM 140 145 138  --  133*   POTASSIUM 3.5 4.0 3.8  --  4.0   CHLORIDE 105 110* 105  --  100   CO2 22.0 23.0 22.0  --  23.0   ANION GAP 13.0 12.0 11.0  --  10.0   BUN 12 9 9  --  15   CREATININE 0.97 0.92 0.87  --  0.99   GLUCOSE 110* 77 108*  --  115*   CALCIUM 8.1* 8.4* 8.0*  --  8.1*   IONIZED CALCIUM  --   --   --  1.19*  --    HEMOGLOBIN A1C  --   --   --  5.0  --          Lab 11/08/21 0345 11/07/21 0324 11/05/21 2249   TOTAL PROTEIN 7.3 7.3 7.6   ALBUMIN 2.90* 2.90* 2.90*   GLOBULIN 4.4 4.4 4.7   ALT (SGPT) 13 12 13   AST (SGOT) 15 14 22   BILIRUBIN 0.2 0.2 0.2   ALK PHOS 54 59 63                     Brief Urine Lab Results     None        Microbiology Results (last 10 days)     Procedure Component Value - Date/Time    Wound Culture - Wound, Leg, Left [[id number]] Collected: 11/07/21 1710    Lab Status: Preliminary result Specimen: Wound from Leg, Left Updated: 11/08/21 1158     Wound Culture Growth present, too young to evaluate     Gram Stain Few (2+) WBCs per low power field      Few (2+) Gram positive cocci in pairs and clusters    Blood Culture - Blood, Arm, Left [[id number]]  (Normal) Collected: 11/05/21 2249    Lab Status: Preliminary result Specimen: Blood from Arm, Left Updated: 11/08/21 2200     Blood Culture No growth at 3 days    Blood Culture - Blood, Arm, Right [[id number]]  (Normal) Collected: 11/05/21 2249    Lab Status: Preliminary result Specimen: Blood from Arm, Right Updated: 11/08/21 2200     Blood Culture No growth at 3 days                       Results for orders placed during the hospital encounter of 11/05/21    Adult Transthoracic Echo Limited W/ Cont if Necessary Per Protocol    Interpretation Summary  Normal LV size and contractility EF of 60 to 65%  Normal  RV size  Normal atrial size  Aortic valve, mitral valve, tricuspid valve appears structurally normal, no significant regurgitation seen.  No vegetation seen  No pericardial effusion seen.  Proximal aorta appears normal in size.      Labs Pending at Discharge:  Pending Labs     Order Current Status    HIV-1 RNA, Quantitative, PCR (graph) In process    T-helper Cells (CD4) Count In process    Blood Culture - Blood, Arm, Left Preliminary result    Blood Culture - Blood, Arm, Right Preliminary result    Wound Culture - Wound, Leg, Left Preliminary result          Procedures Performed           Consults:   Consults     Date and Time Order Name Status Description    11/7/2021  2:34 PM Inpatient General Surgery Consult      11/7/2021  2:34 PM Inpatient Cardiology Consult Completed     11/6/2021 12:21 AM Inpatient Infectious Diseases Consult Completed     11/5/2021 11:33 PM Hospitalist (on-call MD unless specified)              Discharge Details        Discharge Medications      New Medications      Instructions Start Date   DAPTOmycin 550 mg in sodium chloride 0.9 % 50 mL   550 mg, Intravenous, Every 24 Hours         Continue These Medications      Instructions Start Date   amphetamine-dextroamphetamine 30 MG tablet  Commonly known as: ADDERALL   30 mg, Oral, 2 times daily      ARIPiprazole 10 MG tablet  Commonly known as: ABILIFY   10 mg, Oral, Daily      busPIRone 30 MG tablet  Commonly known as: BUSPAR   30 mg, Oral, 2 times daily      Descovy 200-25 MG per tablet  Generic drug: Emtricitabine-Tenofovir AF   1 tablet, Oral, Daily      FLUoxetine 20 MG capsule  Commonly known as: PROzac   20 mg, Oral, 3 Times Daily      Prezcobix 800-150 MG per tablet  Generic drug: Darunavir-Cobicistat   1 tablet, Oral, Daily      tadalafil 20 MG tablet  Commonly known as: CIALIS   Daily PRN      vitamin D 1.25 MG (60870 UT) capsule capsule  Commonly known as: ERGOCALCIFEROL   50,000 Units, Oral, Weekly, Monday             No Known  Allergies      Discharge Disposition: Good  Home or Self Care    Diet:  Hospital:  Diet Order   Procedures   • Diet Regular         Discharge Activity:   Activity Instructions     Activity as Tolerated              CODE STATUS:  Code Status and Medical Interventions:   Ordered at: 11/06/21 0025     Code Status (Patient has no pulse and is not breathing):    CPR (Attempt to Resuscitate)     Medical Interventions (Patient has pulse or is breathing):    Full Support         Future Appointments   Date Time Provider Department Center   11/10/2021  7:30 AM ROOM 04 [facility] None   11/11/2021  7:30 AM ROOM 04 [facility] None   11/12/2021  7:30 AM ROOM 04 [facility] None   11/13/2021  7:30 AM ROOM 04 [facility] None   11/14/2021  7:30 AM ROOM 04 [facility] None   11/15/2021  7:30 AM ROOM 04 [facility] None   11/16/2021  7:30 AM ROOM 04 [facility] None   11/17/2021  7:30 AM ROOM 04 [facility] None   11/18/2021  7:30 AM ROOM 04 [facility] None   11/19/2021  7:30 AM ROOM 04 [facility] None   11/20/2021  7:30 AM ROOM 04 [facility] None   11/21/2021  7:30 AM ROOM 04 [facility] None   11/22/2021  9:30 AM Jan Lock MD [facility]   11/22/2021 10:00 AM ROOM 06 [facility] None   11/23/2021  7:30 AM ROOM 04 [facility] None   11/24/2021  7:30 AM ROOM 04 [facility] None   11/25/2021  7:30 AM ROOM 04 [facility] None   11/26/2021  7:30 AM ROOM 04 [facility] None   11/27/2021  7:30 AM ROOM 04 [facility] None   11/28/2021  7:30 AM ROOM 04 [facility] None   11/29/2021  7:30 AM ROOM 04 [facility] None   11/30/2021  7:30 AM ROOM 04 [facility]  [facility] None   12/1/2021  7:30 AM ROOM 04 [facility]  [facility] None   12/2/2021  7:30 AM ROOM 04 [facility]  [facility] None   12/3/2021  7:30 AM ROOM 04 [facility]  [facility] None   12/4/2021  7:30 AM ROOM 04 [facility]  [facility] None        Additional Instructions for the Follow-ups that You Need to Schedule     Discharge Follow-up with PCP   As directed       Currently Documented PCP:    No primary care provider on file.    PCP Phone Number:    None     Follow Up Details: Please follow-up with primary care within a week continue monitoring wound site               Time spent on Discharge including face to face service:  35 minutes    This patient has been examined wearing appropriate Personal Protective Equipment and discussed with hospital infection control department. 11/09/21      Signature: Electronically signed by Jp Ocampo MD, 11/09/21, 12:52 PM EST.

## 2021-11-09 NOTE — PLAN OF CARE
Problem: Adult Inpatient Plan of Care  Goal: Absence of Hospital-Acquired Illness or Injury  Intervention: Identify and Manage Fall Risk  Recent Flowsheet Documentation  Taken 11/9/2021 0301 by Fabio Pedraza LPN  Safety Promotion/Fall Prevention:   safety round/check completed   room organization consistent   nonskid shoes/slippers when out of bed   muscle strengthening facilitated   mobility aid in reach   lighting adjusted   clutter free environment maintained   assistive device/personal items within reach   activity supervised  Taken 11/9/2021 0100 by Fabio Pedraza LPN  Safety Promotion/Fall Prevention:   activity supervised   assistive device/personal items within reach   fall prevention program maintained  Taken 11/8/2021 2301 by Fabio Pedraza LPN  Safety Promotion/Fall Prevention:   activity supervised   assistive device/personal items within reach   safety round/check completed   room organization consistent   nonskid shoes/slippers when out of bed   muscle strengthening facilitated   gait belt   fall prevention program maintained  Taken 11/8/2021 2100 by Fabio Pedraza LPN  Safety Promotion/Fall Prevention:   activity supervised   assistive device/personal items within reach   muscle strengthening facilitated   nonskid shoes/slippers when out of bed  Taken 11/8/2021 1958 by Fabio Pedraza LPN  Safety Promotion/Fall Prevention:   safety round/check completed   room organization consistent   nonskid shoes/slippers when out of bed   muscle strengthening facilitated   mobility aid in reach   lighting adjusted   fall prevention program maintained   clutter free environment maintained   assistive device/personal items within reach   activity supervised  Intervention: Prevent Skin Injury  Recent Flowsheet Documentation  Taken 11/9/2021 0301 by Fabio Pedraza LPN  Body Position: position changed independently  Taken 11/9/2021 0100 by Fabio Pedraza LPN  Body Position:  position changed independently  Taken 11/8/2021 2301 by Fabio Pedraza LPN  Body Position: position changed independently  Taken 11/8/2021 2100 by Fabio Pedraza LPN  Body Position: position changed independently  Taken 11/8/2021 1958 by Fabio Pedraza LPN  Body Position: position changed independently  Intervention: Prevent Infection  Recent Flowsheet Documentation  Taken 11/9/2021 0301 by Fabio Pedraza LPN  Infection Prevention:   visitors restricted/screened   single patient room provided   rest/sleep promoted   personal protective equipment utilized   hand hygiene promoted  Taken 11/9/2021 0100 by Fabio Pedraza LPN  Infection Prevention:   visitors restricted/screened   single patient room provided   rest/sleep promoted   personal protective equipment utilized   hand hygiene promoted  Taken 11/8/2021 2301 by Fabio Pedraza LPN  Infection Prevention:   visitors restricted/screened   single patient room provided   rest/sleep promoted   personal protective equipment utilized   hand hygiene promoted  Taken 11/8/2021 2100 by Fabio Pedraza LPN  Infection Prevention:   visitors restricted/screened   single patient room provided   rest/sleep promoted   personal protective equipment utilized   hand hygiene promoted  Taken 11/8/2021 1958 by Fabio Pedraza LPN  Infection Prevention:   visitors restricted/screened   single patient room provided   rest/sleep promoted   personal protective equipment utilized   hand hygiene promoted  Goal: Optimal Comfort and Wellbeing  Intervention: Provide Person-Centered Care  Recent Flowsheet Documentation  Taken 11/8/2021 1958 by Fabio Pedraza LPN  Trust Relationship/Rapport: care explained     Problem: Skin or Soft Tissue Infection  Goal: Infection Symptom Resolution  Intervention: Provide Meticulous Infection Site Care  Recent Flowsheet Documentation  Taken 11/9/2021 0301 by Fabio Pedraza LPN  Infection Prevention:    visitors restricted/screened   single patient room provided   rest/sleep promoted   personal protective equipment utilized   hand hygiene promoted  Taken 11/9/2021 0100 by Fabio Pedraza LPN  Infection Prevention:   visitors restricted/screened   single patient room provided   rest/sleep promoted   personal protective equipment utilized   hand hygiene promoted  Taken 11/8/2021 2301 by Fabio Pedraza LPN  Infection Prevention:   visitors restricted/screened   single patient room provided   rest/sleep promoted   personal protective equipment utilized   hand hygiene promoted  Taken 11/8/2021 2100 by Fabio Pedraza LPN  Infection Prevention:   visitors restricted/screened   single patient room provided   rest/sleep promoted   personal protective equipment utilized   hand hygiene promoted  Taken 11/8/2021 1958 by Fabio Pedraza LPN  Infection Prevention:   visitors restricted/screened   single patient room provided   rest/sleep promoted   personal protective equipment utilized   hand hygiene promoted  Intervention: Minimize and Manage Infection Progression  Recent Flowsheet Documentation  Taken 11/9/2021 0301 by Fabio Pedraza LPN  Isolation Precautions: contact precautions maintained  Taken 11/9/2021 0100 by Fabio Pedraza LPN  Isolation Precautions: contact precautions maintained  Taken 11/8/2021 2301 by Fabio Pedraza LPN  Isolation Precautions: contact precautions maintained  Taken 11/8/2021 2100 by Fabio Pedraza LPN  Isolation Precautions: contact precautions maintained  Taken 11/8/2021 1958 by Fabio Pedraza LPN  Isolation Precautions: contact precautions maintained   Goal Outcome Evaluation:

## 2021-11-10 ENCOUNTER — TRANSITIONAL CARE MANAGEMENT TELEPHONE ENCOUNTER (OUTPATIENT)
Dept: CALL CENTER | Facility: HOSPITAL | Age: 36
End: 2021-11-10

## 2021-11-10 ENCOUNTER — HOSPITAL ENCOUNTER (OUTPATIENT)
Dept: INFUSION THERAPY | Facility: HOSPITAL | Age: 36
Setting detail: INFUSION SERIES
Discharge: HOME | End: 2021-11-10

## 2021-11-10 VITALS — DIASTOLIC BLOOD PRESSURE: 82 MMHG | HEART RATE: 73 BPM | SYSTOLIC BLOOD PRESSURE: 128 MMHG | OXYGEN SATURATION: 99 %

## 2021-11-10 DIAGNOSIS — R78.81 MRSA BACTEREMIA: Primary | ICD-10-CM

## 2021-11-10 DIAGNOSIS — B95.62 MRSA BACTEREMIA: Primary | ICD-10-CM

## 2021-11-10 LAB
BACTERIA SPEC AEROBE CULT: ABNORMAL
BACTERIA SPEC AEROBE CULT: NORMAL
BACTERIA SPEC AEROBE CULT: NORMAL
GRAM STN SPEC: ABNORMAL
GRAM STN SPEC: ABNORMAL
HIV1 RNA # SERPL NAA+PROBE: NORMAL COPIES/ML
HIV1 RNA SERPL NAA+PROBE-LOG#: 5.16 LOG10COPY/ML

## 2021-11-10 PROCEDURE — 96365 THER/PROPH/DIAG IV INF INIT: CPT

## 2021-11-10 PROCEDURE — 25010000002 DAPTOMYCIN PER 1 MG: Performed by: INTERNAL MEDICINE

## 2021-11-10 RX ADMIN — DAPTOMYCIN 550 MG: 500 INJECTION, POWDER, LYOPHILIZED, FOR SOLUTION INTRAVENOUS at 08:55

## 2021-11-10 NOTE — OUTREACH NOTE
Prep Survey      Responses   [facility] patient discharged from? [facility]   Is LACE score < 7 ? No   Emergency Room discharge w/ pulse ox? No   Eligibility [facility]   Date of Admission 11/05/21   Date of Discharge 11/09/21   Discharge Disposition Home or Self Care   Discharge diagnosis cellulitis,  hyperglycemia,  MRSA,  Anemia,  HIV infection   Does the patient have one of the following disease processes/diagnoses(primary or secondary)? Other   Does the patient have Home health ordered? No   Is there a DME ordered? No   Prep survey completed? Yes          Alicia Soni RN

## 2021-11-10 NOTE — OUTREACH NOTE
Call Center TCM Note      Responses   [facility] facility patient discharged from? [facility]   Does the patient have one of the following disease processes/diagnoses(primary or secondary)? Other   TCM attempt successful? No   Unsuccessful attempts Attempt 1   Call Status Voice mail issues   Call Status Comments MAILBOX FULL          Michelle Patel MA    11/10/2021, 11:41 EST

## 2021-11-10 NOTE — OUTREACH NOTE
Call Center TCM Note      Responses   [facility] patient discharged from? [facility]   Does the patient have one of the following disease processes/diagnoses(primary or secondary)? Other   TCM attempt successful? No   Unsuccessful attempts Attempt 2          Michelle Patel MA    11/10/2021, 15:48 EST

## 2021-11-11 ENCOUNTER — TRANSITIONAL CARE MANAGEMENT TELEPHONE ENCOUNTER (OUTPATIENT)
Dept: CALL CENTER | Facility: HOSPITAL | Age: 36
End: 2021-11-11

## 2021-11-11 ENCOUNTER — HOSPITAL ENCOUNTER (OUTPATIENT)
Dept: INFUSION THERAPY | Facility: HOSPITAL | Age: 36
Setting detail: INFUSION SERIES
Discharge: HOME | End: 2021-11-11

## 2021-11-11 VITALS
HEART RATE: 107 BPM | RESPIRATION RATE: 20 BRPM | DIASTOLIC BLOOD PRESSURE: 83 MMHG | OXYGEN SATURATION: 99 % | SYSTOLIC BLOOD PRESSURE: 176 MMHG

## 2021-11-11 DIAGNOSIS — R78.81 MRSA BACTEREMIA: Primary | ICD-10-CM

## 2021-11-11 DIAGNOSIS — B95.62 MRSA BACTEREMIA: Primary | ICD-10-CM

## 2021-11-11 PROCEDURE — 96365 THER/PROPH/DIAG IV INF INIT: CPT

## 2021-11-11 PROCEDURE — 25010000002 DAPTOMYCIN PER 1 MG: Performed by: INTERNAL MEDICINE

## 2021-11-11 RX ADMIN — DAPTOMYCIN 550 MG: 500 INJECTION, POWDER, LYOPHILIZED, FOR SOLUTION INTRAVENOUS at 07:50

## 2021-11-11 NOTE — OUTREACH NOTE
Call Center TCM Note      Responses   [facility] patient discharged from? [facility]   Does the patient have one of the following disease processes/diagnoses(primary or secondary)? Other   TCM attempt successful? No   Unsuccessful attempts Attempt 3          Vera Lopez RN    11/11/2021, 09:56 EST

## 2021-11-12 ENCOUNTER — HOSPITAL ENCOUNTER (OUTPATIENT)
Dept: INFUSION THERAPY | Facility: HOSPITAL | Age: 36
Setting detail: INFUSION SERIES
Discharge: HOME | End: 2021-11-12

## 2021-11-12 VITALS
SYSTOLIC BLOOD PRESSURE: 137 MMHG | TEMPERATURE: 97.6 F | DIASTOLIC BLOOD PRESSURE: 84 MMHG | RESPIRATION RATE: 16 BRPM | OXYGEN SATURATION: 100 %

## 2021-11-12 DIAGNOSIS — R78.81 MRSA BACTEREMIA: Primary | ICD-10-CM

## 2021-11-12 DIAGNOSIS — B95.62 MRSA BACTEREMIA: Primary | ICD-10-CM

## 2021-11-12 PROCEDURE — 96365 THER/PROPH/DIAG IV INF INIT: CPT

## 2021-11-12 PROCEDURE — 25010000002 DAPTOMYCIN PER 1 MG: Performed by: INTERNAL MEDICINE

## 2021-11-12 RX ADMIN — DAPTOMYCIN 550 MG: 500 INJECTION, POWDER, LYOPHILIZED, FOR SOLUTION INTRAVENOUS at 14:09

## 2021-11-13 ENCOUNTER — HOSPITAL ENCOUNTER (OUTPATIENT)
Dept: INFUSION THERAPY | Facility: HOSPITAL | Age: 36
Setting detail: INFUSION SERIES
Discharge: HOME | End: 2021-11-13

## 2021-11-13 VITALS
SYSTOLIC BLOOD PRESSURE: 154 MMHG | RESPIRATION RATE: 16 BRPM | TEMPERATURE: 97 F | HEART RATE: 98 BPM | OXYGEN SATURATION: 99 % | DIASTOLIC BLOOD PRESSURE: 82 MMHG

## 2021-11-13 DIAGNOSIS — B95.62 MRSA BACTEREMIA: Primary | ICD-10-CM

## 2021-11-13 DIAGNOSIS — R78.81 MRSA BACTEREMIA: Primary | ICD-10-CM

## 2021-11-13 PROCEDURE — 96365 THER/PROPH/DIAG IV INF INIT: CPT

## 2021-11-13 PROCEDURE — 25010000002 DAPTOMYCIN PER 1 MG: Performed by: INTERNAL MEDICINE

## 2021-11-13 RX ADMIN — DAPTOMYCIN 550 MG: 500 INJECTION, POWDER, LYOPHILIZED, FOR SOLUTION INTRAVENOUS at 09:33

## 2021-11-14 ENCOUNTER — HOSPITAL ENCOUNTER (OUTPATIENT)
Dept: INFUSION THERAPY | Facility: HOSPITAL | Age: 36
Setting detail: INFUSION SERIES
Discharge: HOME | End: 2021-11-14

## 2021-11-14 ENCOUNTER — TELEPHONE (OUTPATIENT)
Dept: INFUSION THERAPY | Facility: HOSPITAL | Age: 36
End: 2021-11-14

## 2021-11-14 VITALS
RESPIRATION RATE: 15 BRPM | SYSTOLIC BLOOD PRESSURE: 147 MMHG | OXYGEN SATURATION: 99 % | HEART RATE: 112 BPM | DIASTOLIC BLOOD PRESSURE: 93 MMHG

## 2021-11-14 DIAGNOSIS — R78.81 MRSA BACTEREMIA: Primary | ICD-10-CM

## 2021-11-14 DIAGNOSIS — B95.62 MRSA BACTEREMIA: Primary | ICD-10-CM

## 2021-11-14 PROCEDURE — 25010000002 DAPTOMYCIN PER 1 MG: Performed by: INTERNAL MEDICINE

## 2021-11-14 PROCEDURE — 96365 THER/PROPH/DIAG IV INF INIT: CPT

## 2021-11-14 RX ADMIN — DAPTOMYCIN 550 MG: 500 INJECTION, POWDER, LYOPHILIZED, FOR SOLUTION INTRAVENOUS at 09:38

## 2021-11-15 ENCOUNTER — HOSPITAL ENCOUNTER (OUTPATIENT)
Dept: INFUSION THERAPY | Facility: HOSPITAL | Age: 36
Setting detail: INFUSION SERIES
Discharge: HOME | End: 2021-11-15

## 2021-11-15 VITALS
RESPIRATION RATE: 22 BRPM | DIASTOLIC BLOOD PRESSURE: 85 MMHG | HEART RATE: 113 BPM | SYSTOLIC BLOOD PRESSURE: 138 MMHG | OXYGEN SATURATION: 98 %

## 2021-11-15 DIAGNOSIS — B95.62 MRSA BACTEREMIA: Primary | ICD-10-CM

## 2021-11-15 DIAGNOSIS — R78.81 MRSA BACTEREMIA: Primary | ICD-10-CM

## 2021-11-15 DIAGNOSIS — L03.115 CELLULITIS AND ABSCESS OF RIGHT LOWER EXTREMITY: ICD-10-CM

## 2021-11-15 DIAGNOSIS — L02.415 CELLULITIS AND ABSCESS OF RIGHT LOWER EXTREMITY: ICD-10-CM

## 2021-11-15 LAB
ALBUMIN SERPL-MCNC: 2.7 G/DL (ref 3.5–5.2)
ALBUMIN/GLOB SERPL: 0.5 G/DL
ALP SERPL-CCNC: 84 U/L (ref 39–117)
ALT SERPL W P-5'-P-CCNC: 10 U/L (ref 1–41)
ANION GAP SERPL CALCULATED.3IONS-SCNC: 10 MMOL/L (ref 5–15)
AST SERPL-CCNC: 13 U/L (ref 1–40)
BASOPHILS # BLD AUTO: 0 10*3/MM3 (ref 0–0.2)
BASOPHILS NFR BLD AUTO: 0.2 % (ref 0–1.5)
BILIRUB SERPL-MCNC: 0.4 MG/DL (ref 0–1.2)
BUN SERPL-MCNC: 10 MG/DL (ref 6–20)
BUN/CREAT SERPL: 12.2 (ref 7–25)
CALCIUM SPEC-SCNC: 7.8 MG/DL (ref 8.6–10.5)
CHLORIDE SERPL-SCNC: 95 MMOL/L (ref 98–107)
CO2 SERPL-SCNC: 25 MMOL/L (ref 22–29)
CREAT SERPL-MCNC: 0.77 MG/DL (ref 0.76–1.27)
CREAT SERPL-MCNC: 0.82 MG/DL (ref 0.76–1.27)
DEPRECATED RDW RBC AUTO: 47.3 FL (ref 37–54)
EOSINOPHIL # BLD AUTO: 0.1 10*3/MM3 (ref 0–0.4)
EOSINOPHIL NFR BLD AUTO: 1.4 % (ref 0.3–6.2)
ERYTHROCYTE [DISTWIDTH] IN BLOOD BY AUTOMATED COUNT: 15.9 % (ref 12.3–15.4)
GFR SERPL CREATININE-BSD FRML MDRD: 108 ML/MIN/1.73
GFR SERPL CREATININE-BSD FRML MDRD: 116 ML/MIN/1.73
GLOBULIN UR ELPH-MCNC: 5 GM/DL
GLUCOSE SERPL-MCNC: 92 MG/DL (ref 65–99)
HCT VFR BLD AUTO: 32.4 % (ref 37.5–51)
HGB BLD-MCNC: 11.2 G/DL (ref 13–17.7)
LYMPHOCYTES # BLD AUTO: 1.4 10*3/MM3 (ref 0.7–3.1)
LYMPHOCYTES NFR BLD AUTO: 26.1 % (ref 19.6–45.3)
MCH RBC QN AUTO: 29.8 PG (ref 26.6–33)
MCHC RBC AUTO-ENTMCNC: 34.5 G/DL (ref 31.5–35.7)
MCV RBC AUTO: 86.4 FL (ref 79–97)
MONOCYTES # BLD AUTO: 0.9 10*3/MM3 (ref 0.1–0.9)
MONOCYTES NFR BLD AUTO: 16.5 % (ref 5–12)
NEUTROPHILS NFR BLD AUTO: 3 10*3/MM3 (ref 1.7–7)
NEUTROPHILS NFR BLD AUTO: 55.8 % (ref 42.7–76)
NRBC BLD AUTO-RTO: 0.1 /100 WBC (ref 0–0.2)
PLATELET # BLD AUTO: 293 10*3/MM3 (ref 140–450)
PMV BLD AUTO: 7.7 FL (ref 6–12)
POTASSIUM SERPL-SCNC: 3.8 MMOL/L (ref 3.5–5.2)
PROT SERPL-MCNC: 7.7 G/DL (ref 6–8.5)
RBC # BLD AUTO: 3.75 10*6/MM3 (ref 4.14–5.8)
SODIUM SERPL-SCNC: 130 MMOL/L (ref 136–145)
WBC # BLD AUTO: 5.4 10*3/MM3 (ref 3.4–10.8)

## 2021-11-15 PROCEDURE — 25010000002 DAPTOMYCIN PER 1 MG: Performed by: INTERNAL MEDICINE

## 2021-11-15 PROCEDURE — 80053 COMPREHEN METABOLIC PANEL: CPT | Performed by: FAMILY MEDICINE

## 2021-11-15 PROCEDURE — 96365 THER/PROPH/DIAG IV INF INIT: CPT

## 2021-11-15 PROCEDURE — 82565 ASSAY OF CREATININE: CPT | Performed by: INTERNAL MEDICINE

## 2021-11-15 PROCEDURE — 85025 COMPLETE CBC W/AUTO DIFF WBC: CPT | Performed by: INTERNAL MEDICINE

## 2021-11-15 PROCEDURE — 36415 COLL VENOUS BLD VENIPUNCTURE: CPT

## 2021-11-15 RX ADMIN — DAPTOMYCIN 550 MG: 500 INJECTION, POWDER, LYOPHILIZED, FOR SOLUTION INTRAVENOUS at 15:49

## 2021-11-16 ENCOUNTER — HOSPITAL ENCOUNTER (OUTPATIENT)
Dept: INFUSION THERAPY | Facility: HOSPITAL | Age: 36
Setting detail: INFUSION SERIES
Discharge: HOME | End: 2021-11-16

## 2021-11-16 DIAGNOSIS — B95.62 MRSA BACTEREMIA: Primary | ICD-10-CM

## 2021-11-16 DIAGNOSIS — R78.81 MRSA BACTEREMIA: Primary | ICD-10-CM

## 2021-11-16 PROCEDURE — 96365 THER/PROPH/DIAG IV INF INIT: CPT

## 2021-11-16 PROCEDURE — 25010000002 DAPTOMYCIN PER 1 MG: Performed by: INTERNAL MEDICINE

## 2021-11-16 RX ADMIN — DAPTOMYCIN 550 MG: 500 INJECTION, POWDER, LYOPHILIZED, FOR SOLUTION INTRAVENOUS at 08:17

## 2021-11-17 ENCOUNTER — READMISSION MANAGEMENT (OUTPATIENT)
Dept: CALL CENTER | Facility: HOSPITAL | Age: 36
End: 2021-11-17

## 2021-11-17 ENCOUNTER — HOSPITAL ENCOUNTER (OUTPATIENT)
Dept: INFUSION THERAPY | Facility: HOSPITAL | Age: 36
Setting detail: INFUSION SERIES
Discharge: HOME | End: 2021-11-17

## 2021-11-17 VITALS
SYSTOLIC BLOOD PRESSURE: 126 MMHG | RESPIRATION RATE: 14 BRPM | HEART RATE: 95 BPM | TEMPERATURE: 97.7 F | DIASTOLIC BLOOD PRESSURE: 86 MMHG | OXYGEN SATURATION: 99 %

## 2021-11-17 DIAGNOSIS — R78.81 MRSA BACTEREMIA: Primary | ICD-10-CM

## 2021-11-17 DIAGNOSIS — B95.62 MRSA BACTEREMIA: Primary | ICD-10-CM

## 2021-11-17 PROCEDURE — 96365 THER/PROPH/DIAG IV INF INIT: CPT

## 2021-11-17 PROCEDURE — 25010000002 DAPTOMYCIN PER 1 MG: Performed by: INTERNAL MEDICINE

## 2021-11-17 RX ADMIN — DAPTOMYCIN 550 MG: 500 INJECTION, POWDER, LYOPHILIZED, FOR SOLUTION INTRAVENOUS at 07:46

## 2021-11-17 NOTE — OUTREACH NOTE
Medical Week 2 Survey      Responses   [facility] patient discharged from? [facility]   Does the patient have one of the following disease processes/diagnoses(primary or secondary)? Other   Week 2 attempt successful? No   Unsuccessful attempts Attempt 1          Bren Esteban LPN

## 2021-11-18 ENCOUNTER — HOSPITAL ENCOUNTER (OUTPATIENT)
Dept: INFUSION THERAPY | Facility: HOSPITAL | Age: 36
Setting detail: INFUSION SERIES
Discharge: HOME | End: 2021-11-18

## 2021-11-18 ENCOUNTER — READMISSION MANAGEMENT (OUTPATIENT)
Dept: CALL CENTER | Facility: HOSPITAL | Age: 36
End: 2021-11-18

## 2021-11-18 VITALS
DIASTOLIC BLOOD PRESSURE: 87 MMHG | TEMPERATURE: 97.7 F | RESPIRATION RATE: 14 BRPM | OXYGEN SATURATION: 99 % | SYSTOLIC BLOOD PRESSURE: 130 MMHG | HEART RATE: 83 BPM

## 2021-11-18 DIAGNOSIS — B95.62 MRSA BACTEREMIA: Primary | ICD-10-CM

## 2021-11-18 DIAGNOSIS — R78.81 MRSA BACTEREMIA: Primary | ICD-10-CM

## 2021-11-18 PROCEDURE — 96365 THER/PROPH/DIAG IV INF INIT: CPT

## 2021-11-18 PROCEDURE — 25010000002 DAPTOMYCIN PER 1 MG: Performed by: INTERNAL MEDICINE

## 2021-11-18 RX ADMIN — DAPTOMYCIN 550 MG: 500 INJECTION, POWDER, LYOPHILIZED, FOR SOLUTION INTRAVENOUS at 08:41

## 2021-11-18 NOTE — OUTREACH NOTE
Medical Week 2 Survey      Responses   [facility] patient discharged from? [facility]   Does the patient have one of the following disease processes/diagnoses(primary or secondary)? Other   Week 2 attempt successful? Yes   Call start time 1118   Discharge diagnosis cellulitis,  hyperglycemia,  MRSA,  Anemia,  HIV infection   Call end time 1120   Meds reviewed with patient/caregiver? Yes   Is the patient having any side effects they believe may be caused by any medication additions or changes? No   Does the patient have all medications ordered at discharge? Yes   Prescription comments PATIENT IS GETTING IV ANTIBIOTICS AT AMBULATORY CARE EACH DAY   Is the patient taking all medications as directed (includes completed medication regime)? Yes   Does the patient have a primary care provider?  Yes   Does the patient have an appointment with their PCP within 7 days of discharge? Greater than 7 days   Comments regarding PCP PATIENT HAS A FOLLOW UP APPOINTMENT WITH HIS PCP ON 11/22/21   What is preventing the patient from scheduling follow up appointments within 7 days of discharge? Earlier appointment not available   Nursing Interventions Verified appointment date/time/provider   Has the patient kept scheduled appointments due by today? Yes   Comments PATIENT HAS KEPT HIS APPOINTMENTS AT AMBULATORY   Has home health visited the patient within 72 hours of discharge? N/A   Psychosocial issues? No   Did the patient receive a copy of their discharge instructions? Yes   Nursing interventions Reviewed instructions with patient   What is the patient's perception of their health status since discharge? Improving   Is the patient/caregiver able to teach back signs and symptoms related to disease process for when to call PCP? Yes   Is the patient/caregiver able to teach back signs and symptoms related to disease process for when to call 911? Yes   Is the patient/caregiver able to teach back the hierarchy of who to call/visit for  symptoms/problems? PCP, Specialist, Home health nurse, Urgent Care, ED, 911 Yes   If the patient is a current smoker, are they able to teach back resources for cessation? Smoking cessation support groups  [EPIC ASSESSMENT STATES PATIENT IS A LIGHT TOBACCO SMOKER]   Week 2 Call Completed? Yes          Tracey Polo LPN

## 2021-11-19 ENCOUNTER — HOSPITAL ENCOUNTER (OUTPATIENT)
Dept: INFUSION THERAPY | Facility: HOSPITAL | Age: 36
Setting detail: INFUSION SERIES
Discharge: HOME | End: 2021-11-19

## 2021-11-19 VITALS
RESPIRATION RATE: 15 BRPM | HEART RATE: 78 BPM | SYSTOLIC BLOOD PRESSURE: 126 MMHG | OXYGEN SATURATION: 99 % | DIASTOLIC BLOOD PRESSURE: 85 MMHG

## 2021-11-19 DIAGNOSIS — Z00.00 ANNUAL PHYSICAL EXAM: ICD-10-CM

## 2021-11-19 DIAGNOSIS — B95.62 MRSA BACTEREMIA: Primary | ICD-10-CM

## 2021-11-19 DIAGNOSIS — L02.415 CELLULITIS AND ABSCESS OF RIGHT LOWER EXTREMITY: ICD-10-CM

## 2021-11-19 DIAGNOSIS — L03.115 CELLULITIS AND ABSCESS OF RIGHT LOWER EXTREMITY: ICD-10-CM

## 2021-11-19 DIAGNOSIS — R78.81 MRSA BACTEREMIA: Primary | ICD-10-CM

## 2021-11-19 LAB
ALBUMIN SERPL-MCNC: 2.9 G/DL (ref 3.5–5.2)
ALBUMIN/GLOB SERPL: 0.6 G/DL
ALP SERPL-CCNC: 80 U/L (ref 39–117)
ALT SERPL W P-5'-P-CCNC: 17 U/L (ref 1–41)
ANION GAP SERPL CALCULATED.3IONS-SCNC: 9 MMOL/L (ref 5–15)
ANISOCYTOSIS BLD QL: ABNORMAL
AST SERPL-CCNC: 17 U/L (ref 1–40)
BILIRUB SERPL-MCNC: 0.2 MG/DL (ref 0–1.2)
BUN SERPL-MCNC: 9 MG/DL (ref 6–20)
BUN/CREAT SERPL: 11.7 (ref 7–25)
CALCIUM SPEC-SCNC: 8.1 MG/DL (ref 8.6–10.5)
CHLORIDE SERPL-SCNC: 97 MMOL/L (ref 98–107)
CK SERPL-CCNC: 32 U/L (ref 20–200)
CO2 SERPL-SCNC: 24 MMOL/L (ref 22–29)
CREAT SERPL-MCNC: 0.77 MG/DL (ref 0.76–1.27)
DEPRECATED RDW RBC AUTO: 47.7 FL (ref 37–54)
EOSINOPHIL # BLD MANUAL: 0.14 10*3/MM3 (ref 0–0.4)
EOSINOPHIL NFR BLD MANUAL: 3 % (ref 0.3–6.2)
ERYTHROCYTE [DISTWIDTH] IN BLOOD BY AUTOMATED COUNT: 15.8 % (ref 12.3–15.4)
GFR SERPL CREATININE-BSD FRML MDRD: 116 ML/MIN/1.73
GLOBULIN UR ELPH-MCNC: 5.1 GM/DL
GLUCOSE SERPL-MCNC: 92 MG/DL (ref 65–99)
HCT VFR BLD AUTO: 30.3 % (ref 37.5–51)
HGB BLD-MCNC: 10.3 G/DL (ref 13–17.7)
HOLD SPECIMEN: NORMAL
LYMPHOCYTES # BLD MANUAL: 0.96 10*3/MM3 (ref 0.7–3.1)
LYMPHOCYTES NFR BLD MANUAL: 8 % (ref 5–12)
MCH RBC QN AUTO: 29.3 PG (ref 26.6–33)
MCHC RBC AUTO-ENTMCNC: 34.1 G/DL (ref 31.5–35.7)
MCV RBC AUTO: 86 FL (ref 79–97)
METAMYELOCYTES NFR BLD MANUAL: 1 % (ref 0–0)
MONOCYTES # BLD: 0.38 10*3/MM3 (ref 0.1–0.9)
NEUTROPHILS # BLD AUTO: 3.26 10*3/MM3 (ref 1.7–7)
NEUTROPHILS NFR BLD MANUAL: 56 % (ref 42.7–76)
NEUTS BAND NFR BLD MANUAL: 12 % (ref 0–5)
PLAT MORPH BLD: NORMAL
PLATELET # BLD AUTO: 309 10*3/MM3 (ref 140–450)
PMV BLD AUTO: 7.4 FL (ref 6–12)
POTASSIUM SERPL-SCNC: 4 MMOL/L (ref 3.5–5.2)
PROT SERPL-MCNC: 8 G/DL (ref 6–8.5)
RBC # BLD AUTO: 3.52 10*6/MM3 (ref 4.14–5.8)
SCAN SLIDE: NORMAL
SODIUM SERPL-SCNC: 130 MMOL/L (ref 136–145)
TOXIC GRANULATION: ABNORMAL
VARIANT LYMPHS NFR BLD MANUAL: 15 % (ref 19.6–45.3)
VARIANT LYMPHS NFR BLD MANUAL: 5 % (ref 0–5)
WBC NRBC COR # BLD: 4.8 10*3/MM3 (ref 3.4–10.8)

## 2021-11-19 PROCEDURE — 85007 BL SMEAR W/DIFF WBC COUNT: CPT | Performed by: INTERNAL MEDICINE

## 2021-11-19 PROCEDURE — 96365 THER/PROPH/DIAG IV INF INIT: CPT

## 2021-11-19 PROCEDURE — 82550 ASSAY OF CK (CPK): CPT | Performed by: FAMILY MEDICINE

## 2021-11-19 PROCEDURE — 80061 LIPID PANEL: CPT | Performed by: HOSPITALIST

## 2021-11-19 PROCEDURE — 25010000002 DAPTOMYCIN PER 1 MG: Performed by: INTERNAL MEDICINE

## 2021-11-19 PROCEDURE — 80053 COMPREHEN METABOLIC PANEL: CPT | Performed by: FAMILY MEDICINE

## 2021-11-19 PROCEDURE — 84443 ASSAY THYROID STIM HORMONE: CPT | Performed by: HOSPITALIST

## 2021-11-19 PROCEDURE — 85025 COMPLETE CBC W/AUTO DIFF WBC: CPT | Performed by: INTERNAL MEDICINE

## 2021-11-19 RX ADMIN — DAPTOMYCIN 550 MG: 500 INJECTION, POWDER, LYOPHILIZED, FOR SOLUTION INTRAVENOUS at 08:56

## 2021-11-20 ENCOUNTER — HOSPITAL ENCOUNTER (OUTPATIENT)
Dept: INFUSION THERAPY | Facility: HOSPITAL | Age: 36
Setting detail: INFUSION SERIES
Discharge: HOME | End: 2021-11-20

## 2021-11-20 DIAGNOSIS — B95.62 MRSA BACTEREMIA: Primary | ICD-10-CM

## 2021-11-20 DIAGNOSIS — R78.81 MRSA BACTEREMIA: Primary | ICD-10-CM

## 2021-11-20 PROCEDURE — 25010000002 DAPTOMYCIN PER 1 MG: Performed by: INTERNAL MEDICINE

## 2021-11-20 PROCEDURE — 36415 COLL VENOUS BLD VENIPUNCTURE: CPT

## 2021-11-20 PROCEDURE — 96365 THER/PROPH/DIAG IV INF INIT: CPT

## 2021-11-20 RX ADMIN — DAPTOMYCIN 550 MG: 500 INJECTION, POWDER, LYOPHILIZED, FOR SOLUTION INTRAVENOUS at 07:57

## 2021-11-21 ENCOUNTER — HOSPITAL ENCOUNTER (OUTPATIENT)
Dept: INFUSION THERAPY | Facility: HOSPITAL | Age: 36
Setting detail: INFUSION SERIES
Discharge: HOME | End: 2021-11-21

## 2021-11-21 VITALS
TEMPERATURE: 98 F | SYSTOLIC BLOOD PRESSURE: 147 MMHG | HEART RATE: 126 BPM | OXYGEN SATURATION: 98 % | DIASTOLIC BLOOD PRESSURE: 100 MMHG | RESPIRATION RATE: 16 BRPM

## 2021-11-21 DIAGNOSIS — B95.62 MRSA BACTEREMIA: Primary | ICD-10-CM

## 2021-11-21 DIAGNOSIS — R78.81 MRSA BACTEREMIA: Primary | ICD-10-CM

## 2021-11-21 PROCEDURE — 96365 THER/PROPH/DIAG IV INF INIT: CPT

## 2021-11-21 PROCEDURE — 25010000002 DAPTOMYCIN PER 1 MG: Performed by: INTERNAL MEDICINE

## 2021-11-21 RX ADMIN — DAPTOMYCIN 550 MG: 500 INJECTION, POWDER, LYOPHILIZED, FOR SOLUTION INTRAVENOUS at 08:03

## 2021-11-22 ENCOUNTER — HOSPITAL ENCOUNTER (OUTPATIENT)
Dept: INFUSION THERAPY | Facility: HOSPITAL | Age: 36
Setting detail: INFUSION SERIES
Discharge: HOME | End: 2021-11-22

## 2021-11-22 ENCOUNTER — OFFICE VISIT (OUTPATIENT)
Dept: FAMILY MEDICINE CLINIC | Facility: CLINIC | Age: 36
End: 2021-11-22

## 2021-11-22 VITALS
RESPIRATION RATE: 16 BRPM | SYSTOLIC BLOOD PRESSURE: 128 MMHG | TEMPERATURE: 97.1 F | HEART RATE: 122 BPM | BODY MASS INDEX: 27.44 KG/M2 | OXYGEN SATURATION: 100 % | DIASTOLIC BLOOD PRESSURE: 86 MMHG | WEIGHT: 196 LBS | HEIGHT: 71 IN

## 2021-11-22 VITALS
SYSTOLIC BLOOD PRESSURE: 141 MMHG | TEMPERATURE: 98 F | DIASTOLIC BLOOD PRESSURE: 96 MMHG | RESPIRATION RATE: 16 BRPM | HEART RATE: 112 BPM

## 2021-11-22 DIAGNOSIS — B95.62 MRSA BACTEREMIA: Primary | ICD-10-CM

## 2021-11-22 DIAGNOSIS — R78.81 MRSA BACTEREMIA: Primary | ICD-10-CM

## 2021-11-22 DIAGNOSIS — Z00.00 ANNUAL PHYSICAL EXAM: Primary | ICD-10-CM

## 2021-11-22 LAB
CHOLEST SERPL-MCNC: 112 MG/DL (ref 0–200)
HDLC SERPL-MCNC: 21 MG/DL (ref 40–60)
LDLC SERPL CALC-MCNC: 72 MG/DL (ref 0–100)
LDLC/HDLC SERPL: 3.43 {RATIO}
TRIGL SERPL-MCNC: 95 MG/DL (ref 0–150)
TSH SERPL DL<=0.05 MIU/L-ACNC: 0.55 UIU/ML (ref 0.27–4.2)
VLDLC SERPL-MCNC: 19 MG/DL (ref 5–40)

## 2021-11-22 PROCEDURE — 96365 THER/PROPH/DIAG IV INF INIT: CPT

## 2021-11-22 PROCEDURE — 96366 THER/PROPH/DIAG IV INF ADDON: CPT

## 2021-11-22 PROCEDURE — 25010000002 DAPTOMYCIN PER 1 MG: Performed by: INTERNAL MEDICINE

## 2021-11-22 PROCEDURE — 99395 PREV VISIT EST AGE 18-39: CPT | Performed by: HOSPITALIST

## 2021-11-22 RX ORDER — TRAZODONE HYDROCHLORIDE 50 MG/1
1 TABLET ORAL
COMMUNITY
Start: 2021-10-28

## 2021-11-22 RX ADMIN — DAPTOMYCIN 550 MG: 500 INJECTION, POWDER, LYOPHILIZED, FOR SOLUTION INTRAVENOUS at 08:04

## 2021-11-22 NOTE — PROGRESS NOTES
Please call the patient regarding his abnormal result.    Lipid profile look good except for low HDL ..

## 2021-11-22 NOTE — PROGRESS NOTES
"Moira Funez is a 36 y.o. male.     Subjective / HPI  Patient seen for annual physical. Pt records reviewed. Pt has recent admission to hospital for MRSA abscess / bacteremia, on iv daptomycin. Pt says he do try to eat healthy, do exercise three times a week. Pt advised healthy diet like vegetable, fruit and salad. Pt verbalized understanding. Pt has recent labs including A1c reviewed.     Review of Systems    Objective     /86 (BP Location: Right arm, Patient Position: Sitting, Cuff Size: Adult)   Pulse (!) 122   Temp 97.1 °F (36.2 °C) (Temporal)   Resp 16   Ht 180.3 cm (71\")   Wt 88.9 kg (196 lb)   SpO2 100%   BMI 27.34 kg/m²      Physical Exam  Vitals and nursing note reviewed.   Constitutional:       General: He is not in acute distress.     Appearance: Normal appearance. He is well-developed. He is not ill-appearing, toxic-appearing or diaphoretic.   HENT:      Head: Normocephalic and atraumatic.      Right Ear: Ear canal and external ear normal.      Left Ear: Ear canal and external ear normal.      Nose: Nose normal. No congestion or rhinorrhea.      Mouth/Throat:      Mouth: Mucous membranes are moist.      Pharynx: No oropharyngeal exudate.   Eyes:      General: No scleral icterus.        Right eye: No discharge.         Left eye: No discharge.      Extraocular Movements: Extraocular movements intact.      Conjunctiva/sclera: Conjunctivae normal.      Pupils: Pupils are equal, round, and reactive to light.   Neck:      Thyroid: No thyromegaly.      Vascular: No carotid bruit or JVD.      Trachea: No tracheal deviation.   Cardiovascular:      Rate and Rhythm: Normal rate and regular rhythm.      Pulses: Normal pulses.      Heart sounds: Normal heart sounds. No murmur heard.  No friction rub. No gallop.    Pulmonary:      Effort: Pulmonary effort is normal. No respiratory distress.      Breath sounds: Normal breath sounds. No stridor. No wheezing, rhonchi or rales.   Chest:      " Chest wall: No tenderness.   Abdominal:      General: Bowel sounds are normal. There is no distension.      Palpations: Abdomen is soft. There is no mass.      Tenderness: There is no abdominal tenderness. There is no guarding or rebound.      Hernia: No hernia is present.   Musculoskeletal:         General: No swelling, tenderness, deformity or signs of injury. Normal range of motion.      Cervical back: Normal range of motion and neck supple. No rigidity. No muscular tenderness.      Right lower leg: No edema.      Left lower leg: No edema.   Lymphadenopathy:      Cervical: No cervical adenopathy.   Skin:     General: Skin is warm and dry.      Coloration: Skin is not jaundiced or pale.      Findings: No bruising, erythema or rash.   Neurological:      General: No focal deficit present.      Mental Status: He is alert and oriented to person, place, and time. Mental status is at baseline.      Cranial Nerves: No cranial nerve deficit.      Sensory: No sensory deficit.      Motor: No weakness or abnormal muscle tone.      Coordination: Coordination normal.   Psychiatric:         Mood and Affect: Mood normal.         Behavior: Behavior normal.         Thought Content: Thought content normal.         Judgment: Judgment normal.         Procedures       Assessment/Plan   Diagnoses and all orders for this visit:    1. Annual physical exam (Primary)  -     Lipid panel; Future  -     TSH; Future

## 2021-11-23 ENCOUNTER — HOSPITAL ENCOUNTER (OUTPATIENT)
Dept: INFUSION THERAPY | Facility: HOSPITAL | Age: 36
Setting detail: INFUSION SERIES
Discharge: HOME | End: 2021-11-23

## 2021-11-23 VITALS
TEMPERATURE: 97.8 F | SYSTOLIC BLOOD PRESSURE: 129 MMHG | HEART RATE: 117 BPM | DIASTOLIC BLOOD PRESSURE: 82 MMHG | OXYGEN SATURATION: 96 %

## 2021-11-23 DIAGNOSIS — R78.81 MRSA BACTEREMIA: Primary | ICD-10-CM

## 2021-11-23 DIAGNOSIS — B95.62 MRSA BACTEREMIA: Primary | ICD-10-CM

## 2021-11-23 PROCEDURE — 25010000002 DAPTOMYCIN PER 1 MG: Performed by: INTERNAL MEDICINE

## 2021-11-23 PROCEDURE — 96365 THER/PROPH/DIAG IV INF INIT: CPT

## 2021-11-23 RX ADMIN — DAPTOMYCIN 550 MG: 500 INJECTION, POWDER, LYOPHILIZED, FOR SOLUTION INTRAVENOUS at 12:48

## 2021-11-24 ENCOUNTER — READMISSION MANAGEMENT (OUTPATIENT)
Dept: CALL CENTER | Facility: HOSPITAL | Age: 36
End: 2021-11-24

## 2021-11-24 ENCOUNTER — HOSPITAL ENCOUNTER (OUTPATIENT)
Dept: INFUSION THERAPY | Facility: HOSPITAL | Age: 36
Setting detail: INFUSION SERIES
Discharge: HOME | End: 2021-11-24

## 2021-11-24 VITALS
DIASTOLIC BLOOD PRESSURE: 90 MMHG | RESPIRATION RATE: 16 BRPM | TEMPERATURE: 98.2 F | HEART RATE: 106 BPM | OXYGEN SATURATION: 96 % | SYSTOLIC BLOOD PRESSURE: 134 MMHG

## 2021-11-24 DIAGNOSIS — R78.81 MRSA BACTEREMIA: Primary | ICD-10-CM

## 2021-11-24 DIAGNOSIS — B95.62 MRSA BACTEREMIA: Primary | ICD-10-CM

## 2021-11-24 PROCEDURE — 36591 DRAW BLOOD OFF VENOUS DEVICE: CPT

## 2021-11-24 PROCEDURE — 96365 THER/PROPH/DIAG IV INF INIT: CPT

## 2021-11-24 PROCEDURE — 25010000002 DAPTOMYCIN PER 1 MG: Performed by: INTERNAL MEDICINE

## 2021-11-24 RX ADMIN — DAPTOMYCIN 550 MG: 500 INJECTION, POWDER, LYOPHILIZED, FOR SOLUTION INTRAVENOUS at 11:01

## 2021-11-24 NOTE — OUTREACH NOTE
Medical Week 3 Survey      Responses   [facility] [place] patient discharged from? [facility]   Does the patient have one of the following disease processes/diagnoses(primary or secondary)? Other   Week 3 attempt successful? No   Unsuccessful attempts Attempt 1          Tracey Polo LPN

## 2021-11-25 ENCOUNTER — HOSPITAL ENCOUNTER (OUTPATIENT)
Dept: INFUSION THERAPY | Facility: HOSPITAL | Age: 36
Setting detail: INFUSION SERIES
Discharge: HOME | End: 2021-11-25

## 2021-11-25 VITALS — TEMPERATURE: 96.9 F

## 2021-11-25 DIAGNOSIS — R78.81 MRSA BACTEREMIA: Primary | ICD-10-CM

## 2021-11-25 DIAGNOSIS — B95.62 MRSA BACTEREMIA: Primary | ICD-10-CM

## 2021-11-25 PROCEDURE — 36415 COLL VENOUS BLD VENIPUNCTURE: CPT

## 2021-11-25 PROCEDURE — 25010000002 DAPTOMYCIN PER 1 MG: Performed by: INTERNAL MEDICINE

## 2021-11-25 PROCEDURE — 96365 THER/PROPH/DIAG IV INF INIT: CPT

## 2021-11-25 RX ADMIN — DAPTOMYCIN 550 MG: 500 INJECTION, POWDER, LYOPHILIZED, FOR SOLUTION INTRAVENOUS at 08:35

## 2021-11-26 ENCOUNTER — HOSPITAL ENCOUNTER (OUTPATIENT)
Dept: INFUSION THERAPY | Facility: HOSPITAL | Age: 36
Setting detail: INFUSION SERIES
Discharge: HOME | End: 2021-11-26

## 2021-11-26 VITALS
HEART RATE: 114 BPM | OXYGEN SATURATION: 98 % | DIASTOLIC BLOOD PRESSURE: 91 MMHG | RESPIRATION RATE: 18 BRPM | SYSTOLIC BLOOD PRESSURE: 140 MMHG

## 2021-11-26 DIAGNOSIS — R78.81 MRSA BACTEREMIA: ICD-10-CM

## 2021-11-26 DIAGNOSIS — L03.115 CELLULITIS AND ABSCESS OF RIGHT LOWER EXTREMITY: Primary | ICD-10-CM

## 2021-11-26 DIAGNOSIS — B95.62 MRSA BACTEREMIA: ICD-10-CM

## 2021-11-26 DIAGNOSIS — L02.415 CELLULITIS AND ABSCESS OF RIGHT LOWER EXTREMITY: Primary | ICD-10-CM

## 2021-11-26 LAB
ALBUMIN SERPL-MCNC: 3 G/DL (ref 3.5–5.2)
ALBUMIN/GLOB SERPL: 0.5 G/DL
ALP SERPL-CCNC: 79 U/L (ref 39–117)
ALT SERPL W P-5'-P-CCNC: 16 U/L (ref 1–41)
ANION GAP SERPL CALCULATED.3IONS-SCNC: 11 MMOL/L (ref 5–15)
AST SERPL-CCNC: 15 U/L (ref 1–40)
BILIRUB SERPL-MCNC: 0.3 MG/DL (ref 0–1.2)
BUN SERPL-MCNC: 12 MG/DL (ref 6–20)
BUN/CREAT SERPL: 15.4 (ref 7–25)
CALCIUM SPEC-SCNC: 8 MG/DL (ref 8.6–10.5)
CHLORIDE SERPL-SCNC: 100 MMOL/L (ref 98–107)
CK SERPL-CCNC: 26 U/L (ref 20–200)
CO2 SERPL-SCNC: 25 MMOL/L (ref 22–29)
CREAT SERPL-MCNC: 0.78 MG/DL (ref 0.76–1.27)
DEPRECATED RDW RBC AUTO: 50.8 FL (ref 37–54)
EOSINOPHIL # BLD MANUAL: 0.18 10*3/MM3 (ref 0–0.4)
EOSINOPHIL NFR BLD MANUAL: 4 % (ref 0.3–6.2)
ERYTHROCYTE [DISTWIDTH] IN BLOOD BY AUTOMATED COUNT: 16.5 % (ref 12.3–15.4)
GFR SERPL CREATININE-BSD FRML MDRD: 114 ML/MIN/1.73
GLOBULIN UR ELPH-MCNC: 5.6 GM/DL
GLUCOSE SERPL-MCNC: 99 MG/DL (ref 65–99)
HCT VFR BLD AUTO: 32.5 % (ref 37.5–51)
HGB BLD-MCNC: 11.1 G/DL (ref 13–17.7)
LYMPHOCYTES # BLD MANUAL: 1.33 10*3/MM3 (ref 0.7–3.1)
LYMPHOCYTES NFR BLD MANUAL: 14 % (ref 5–12)
MCH RBC QN AUTO: 29.6 PG (ref 26.6–33)
MCHC RBC AUTO-ENTMCNC: 34.1 G/DL (ref 31.5–35.7)
MCV RBC AUTO: 86.6 FL (ref 79–97)
MONOCYTES # BLD: 0.64 10*3/MM3 (ref 0.1–0.9)
MYELOCYTES NFR BLD MANUAL: 1 % (ref 0–0)
NEUTROPHILS # BLD AUTO: 2.39 10*3/MM3 (ref 1.7–7)
NEUTROPHILS NFR BLD MANUAL: 43 % (ref 42.7–76)
NEUTS BAND NFR BLD MANUAL: 9 % (ref 0–5)
PLATELET # BLD AUTO: 345 10*3/MM3 (ref 140–450)
PMV BLD AUTO: 6.6 FL (ref 6–12)
POTASSIUM SERPL-SCNC: 4.1 MMOL/L (ref 3.5–5.2)
PROT SERPL-MCNC: 8.6 G/DL (ref 6–8.5)
RBC # BLD AUTO: 3.76 10*6/MM3 (ref 4.14–5.8)
RBC MORPH BLD: NORMAL
SCAN SLIDE: NORMAL
SMALL PLATELETS BLD QL SMEAR: ADEQUATE
SODIUM SERPL-SCNC: 136 MMOL/L (ref 136–145)
VARIANT LYMPHS NFR BLD MANUAL: 2 % (ref 0–5)
VARIANT LYMPHS NFR BLD MANUAL: 27 % (ref 19.6–45.3)
WBC MORPH BLD: NORMAL
WBC NRBC COR # BLD: 4.6 10*3/MM3 (ref 3.4–10.8)

## 2021-11-26 PROCEDURE — 85025 COMPLETE CBC W/AUTO DIFF WBC: CPT | Performed by: INTERNAL MEDICINE

## 2021-11-26 PROCEDURE — 82550 ASSAY OF CK (CPK): CPT | Performed by: FAMILY MEDICINE

## 2021-11-26 PROCEDURE — 80053 COMPREHEN METABOLIC PANEL: CPT | Performed by: FAMILY MEDICINE

## 2021-11-26 PROCEDURE — 25010000002 DAPTOMYCIN PER 1 MG: Performed by: INTERNAL MEDICINE

## 2021-11-26 PROCEDURE — 96365 THER/PROPH/DIAG IV INF INIT: CPT

## 2021-11-26 PROCEDURE — 85007 BL SMEAR W/DIFF WBC COUNT: CPT | Performed by: INTERNAL MEDICINE

## 2021-11-26 RX ADMIN — DAPTOMYCIN 550 MG: 500 INJECTION, POWDER, LYOPHILIZED, FOR SOLUTION INTRAVENOUS at 09:32

## 2021-11-27 ENCOUNTER — HOSPITAL ENCOUNTER (OUTPATIENT)
Dept: INFUSION THERAPY | Facility: HOSPITAL | Age: 36
Setting detail: INFUSION SERIES
Discharge: HOME | End: 2021-11-27

## 2021-11-27 VITALS
TEMPERATURE: 98.6 F | SYSTOLIC BLOOD PRESSURE: 131 MMHG | DIASTOLIC BLOOD PRESSURE: 78 MMHG | HEART RATE: 80 BPM | RESPIRATION RATE: 16 BRPM

## 2021-11-27 DIAGNOSIS — B95.62 MRSA BACTEREMIA: Primary | ICD-10-CM

## 2021-11-27 DIAGNOSIS — R78.81 MRSA BACTEREMIA: Primary | ICD-10-CM

## 2021-11-27 PROCEDURE — 25010000002 DAPTOMYCIN PER 1 MG: Performed by: INTERNAL MEDICINE

## 2021-11-27 PROCEDURE — 96365 THER/PROPH/DIAG IV INF INIT: CPT

## 2021-11-27 RX ADMIN — DAPTOMYCIN 550 MG: 500 INJECTION, POWDER, LYOPHILIZED, FOR SOLUTION INTRAVENOUS at 09:12

## 2021-11-28 ENCOUNTER — HOSPITAL ENCOUNTER (OUTPATIENT)
Dept: INFUSION THERAPY | Facility: HOSPITAL | Age: 36
Setting detail: INFUSION SERIES
Discharge: HOME | End: 2021-11-28

## 2021-11-28 VITALS — RESPIRATION RATE: 16 BRPM | SYSTOLIC BLOOD PRESSURE: 136 MMHG | DIASTOLIC BLOOD PRESSURE: 86 MMHG | HEART RATE: 99 BPM

## 2021-11-28 DIAGNOSIS — B95.62 MRSA BACTEREMIA: Primary | ICD-10-CM

## 2021-11-28 DIAGNOSIS — R78.81 MRSA BACTEREMIA: Primary | ICD-10-CM

## 2021-11-28 PROCEDURE — 25010000002 DAPTOMYCIN PER 1 MG: Performed by: INTERNAL MEDICINE

## 2021-11-28 PROCEDURE — 96365 THER/PROPH/DIAG IV INF INIT: CPT

## 2021-11-28 RX ADMIN — DAPTOMYCIN 550 MG: 500 INJECTION, POWDER, LYOPHILIZED, FOR SOLUTION INTRAVENOUS at 08:59

## 2021-11-29 ENCOUNTER — HOSPITAL ENCOUNTER (OUTPATIENT)
Dept: INFUSION THERAPY | Facility: HOSPITAL | Age: 36
Setting detail: INFUSION SERIES
Discharge: HOME | End: 2021-11-29

## 2021-11-29 VITALS
SYSTOLIC BLOOD PRESSURE: 137 MMHG | RESPIRATION RATE: 16 BRPM | TEMPERATURE: 97.8 F | DIASTOLIC BLOOD PRESSURE: 92 MMHG | OXYGEN SATURATION: 100 % | HEART RATE: 107 BPM

## 2021-11-29 DIAGNOSIS — B95.62 MRSA BACTEREMIA: Primary | ICD-10-CM

## 2021-11-29 DIAGNOSIS — R78.81 MRSA BACTEREMIA: Primary | ICD-10-CM

## 2021-11-29 PROCEDURE — 96365 THER/PROPH/DIAG IV INF INIT: CPT

## 2021-11-29 PROCEDURE — 25010000002 DAPTOMYCIN PER 1 MG: Performed by: INTERNAL MEDICINE

## 2021-11-29 RX ADMIN — DAPTOMYCIN 550 MG: 500 INJECTION, POWDER, LYOPHILIZED, FOR SOLUTION INTRAVENOUS at 08:02

## 2021-11-30 ENCOUNTER — HOSPITAL ENCOUNTER (OUTPATIENT)
Dept: INFUSION THERAPY | Facility: HOSPITAL | Age: 36
Setting detail: INFUSION SERIES
Discharge: HOME | End: 2021-11-30

## 2021-11-30 VITALS
HEART RATE: 108 BPM | TEMPERATURE: 97 F | SYSTOLIC BLOOD PRESSURE: 147 MMHG | OXYGEN SATURATION: 98 % | RESPIRATION RATE: 16 BRPM | DIASTOLIC BLOOD PRESSURE: 91 MMHG

## 2021-11-30 DIAGNOSIS — B95.62 MRSA BACTEREMIA: Primary | ICD-10-CM

## 2021-11-30 DIAGNOSIS — R78.81 MRSA BACTEREMIA: Primary | ICD-10-CM

## 2021-11-30 PROCEDURE — 25010000002 DAPTOMYCIN PER 1 MG: Performed by: INTERNAL MEDICINE

## 2021-11-30 PROCEDURE — 96365 THER/PROPH/DIAG IV INF INIT: CPT

## 2021-11-30 RX ADMIN — DAPTOMYCIN 550 MG: 500 INJECTION, POWDER, LYOPHILIZED, FOR SOLUTION INTRAVENOUS at 08:11

## 2021-12-01 ENCOUNTER — HOSPITAL ENCOUNTER (OUTPATIENT)
Dept: INFUSION THERAPY | Facility: HOSPITAL | Age: 36
Setting detail: INFUSION SERIES
Discharge: HOME | End: 2021-12-01

## 2021-12-01 VITALS
HEART RATE: 107 BPM | OXYGEN SATURATION: 99 % | RESPIRATION RATE: 14 BRPM | SYSTOLIC BLOOD PRESSURE: 144 MMHG | DIASTOLIC BLOOD PRESSURE: 95 MMHG

## 2021-12-01 DIAGNOSIS — R78.81 MRSA BACTEREMIA: Primary | ICD-10-CM

## 2021-12-01 DIAGNOSIS — B95.62 MRSA BACTEREMIA: Primary | ICD-10-CM

## 2021-12-01 PROCEDURE — 25010000002 DAPTOMYCIN PER 1 MG: Performed by: INTERNAL MEDICINE

## 2021-12-01 PROCEDURE — 96365 THER/PROPH/DIAG IV INF INIT: CPT

## 2021-12-01 RX ADMIN — DAPTOMYCIN 550 MG: 500 INJECTION, POWDER, LYOPHILIZED, FOR SOLUTION INTRAVENOUS at 11:45

## 2021-12-02 ENCOUNTER — HOSPITAL ENCOUNTER (OUTPATIENT)
Dept: INFUSION THERAPY | Facility: HOSPITAL | Age: 36
Setting detail: INFUSION SERIES
Discharge: HOME | End: 2021-12-02

## 2021-12-02 VITALS
DIASTOLIC BLOOD PRESSURE: 85 MMHG | HEART RATE: 103 BPM | SYSTOLIC BLOOD PRESSURE: 138 MMHG | OXYGEN SATURATION: 99 % | RESPIRATION RATE: 15 BRPM

## 2021-12-02 DIAGNOSIS — R78.81 MRSA BACTEREMIA: Primary | ICD-10-CM

## 2021-12-02 DIAGNOSIS — B95.62 MRSA BACTEREMIA: Primary | ICD-10-CM

## 2021-12-02 PROCEDURE — 96365 THER/PROPH/DIAG IV INF INIT: CPT

## 2021-12-02 PROCEDURE — 25010000002 DAPTOMYCIN PER 1 MG: Performed by: INTERNAL MEDICINE

## 2021-12-02 RX ADMIN — DAPTOMYCIN 550 MG: 500 INJECTION, POWDER, LYOPHILIZED, FOR SOLUTION INTRAVENOUS at 08:06

## 2021-12-03 ENCOUNTER — HOSPITAL ENCOUNTER (OUTPATIENT)
Dept: INFUSION THERAPY | Facility: HOSPITAL | Age: 36
Setting detail: INFUSION SERIES
Discharge: HOME | End: 2021-12-03

## 2021-12-03 VITALS
OXYGEN SATURATION: 98 % | SYSTOLIC BLOOD PRESSURE: 137 MMHG | DIASTOLIC BLOOD PRESSURE: 90 MMHG | HEART RATE: 99 BPM | TEMPERATURE: 98.1 F | RESPIRATION RATE: 16 BRPM

## 2021-12-03 DIAGNOSIS — R78.81 MRSA BACTEREMIA: ICD-10-CM

## 2021-12-03 DIAGNOSIS — B95.62 MRSA BACTEREMIA: ICD-10-CM

## 2021-12-03 DIAGNOSIS — L03.115 CELLULITIS AND ABSCESS OF RIGHT LOWER EXTREMITY: Primary | ICD-10-CM

## 2021-12-03 DIAGNOSIS — L02.415 CELLULITIS AND ABSCESS OF RIGHT LOWER EXTREMITY: Primary | ICD-10-CM

## 2021-12-03 LAB
ANISOCYTOSIS BLD QL: ABNORMAL
DEPRECATED RDW RBC AUTO: 52.9 FL (ref 37–54)
EOSINOPHIL # BLD MANUAL: 0.21 10*3/MM3 (ref 0–0.4)
EOSINOPHIL NFR BLD MANUAL: 4 % (ref 0.3–6.2)
ERYTHROCYTE [DISTWIDTH] IN BLOOD BY AUTOMATED COUNT: 17.1 % (ref 12.3–15.4)
HCT VFR BLD AUTO: 31.5 % (ref 37.5–51)
HGB BLD-MCNC: 10.8 G/DL (ref 13–17.7)
LYMPHOCYTES # BLD MANUAL: 1.43 10*3/MM3 (ref 0.7–3.1)
LYMPHOCYTES NFR BLD MANUAL: 8 % (ref 5–12)
MCH RBC QN AUTO: 30.8 PG (ref 26.6–33)
MCHC RBC AUTO-ENTMCNC: 34.4 G/DL (ref 31.5–35.7)
MCV RBC AUTO: 89.6 FL (ref 79–97)
METAMYELOCYTES NFR BLD MANUAL: 3 % (ref 0–0)
MONOCYTES # BLD: 0.42 10*3/MM3 (ref 0.1–0.9)
NEUTROPHILS # BLD AUTO: 3.07 10*3/MM3 (ref 1.7–7)
NEUTROPHILS NFR BLD MANUAL: 38 % (ref 42.7–76)
NEUTS BAND NFR BLD MANUAL: 20 % (ref 0–5)
PLAT MORPH BLD: NORMAL
PLATELET # BLD AUTO: 272 10*3/MM3 (ref 140–450)
PMV BLD AUTO: 6.8 FL (ref 6–12)
RBC # BLD AUTO: 3.51 10*6/MM3 (ref 4.14–5.8)
SCAN SLIDE: NORMAL
VARIANT LYMPHS NFR BLD MANUAL: 22 % (ref 19.6–45.3)
VARIANT LYMPHS NFR BLD MANUAL: 5 % (ref 0–5)
WBC MORPH BLD: NORMAL
WBC NRBC COR # BLD: 5.3 10*3/MM3 (ref 3.4–10.8)

## 2021-12-03 PROCEDURE — 85007 BL SMEAR W/DIFF WBC COUNT: CPT | Performed by: INTERNAL MEDICINE

## 2021-12-03 PROCEDURE — 85025 COMPLETE CBC W/AUTO DIFF WBC: CPT | Performed by: INTERNAL MEDICINE

## 2021-12-03 PROCEDURE — 25010000002 DAPTOMYCIN PER 1 MG: Performed by: INTERNAL MEDICINE

## 2021-12-03 PROCEDURE — 96365 THER/PROPH/DIAG IV INF INIT: CPT

## 2021-12-03 RX ADMIN — DAPTOMYCIN 550 MG: 500 INJECTION, POWDER, LYOPHILIZED, FOR SOLUTION INTRAVENOUS at 11:18

## 2021-12-04 ENCOUNTER — HOSPITAL ENCOUNTER (OUTPATIENT)
Dept: INFUSION THERAPY | Facility: HOSPITAL | Age: 36
Setting detail: INFUSION SERIES
Discharge: HOME | End: 2021-12-04

## 2021-12-04 DIAGNOSIS — R78.81 MRSA BACTEREMIA: Primary | ICD-10-CM

## 2021-12-04 DIAGNOSIS — B95.62 MRSA BACTEREMIA: Primary | ICD-10-CM

## 2021-12-04 LAB
ALBUMIN SERPL-MCNC: 2.7 G/DL (ref 3.5–5.2)
ALBUMIN/GLOB SERPL: 0.5 G/DL
ALP SERPL-CCNC: 74 U/L (ref 39–117)
ALT SERPL W P-5'-P-CCNC: <5 U/L (ref 1–41)
ANION GAP SERPL CALCULATED.3IONS-SCNC: 9 MMOL/L (ref 5–15)
AST SERPL-CCNC: 23 U/L (ref 1–40)
BILIRUB SERPL-MCNC: 0.2 MG/DL (ref 0–1.2)
BUN SERPL-MCNC: 20 MG/DL (ref 6–20)
BUN/CREAT SERPL: 28.6 (ref 7–25)
CALCIUM SPEC-SCNC: 7.5 MG/DL (ref 8.6–10.5)
CHLORIDE SERPL-SCNC: 105 MMOL/L (ref 98–107)
CO2 SERPL-SCNC: 24 MMOL/L (ref 22–29)
CREAT SERPL-MCNC: 0.7 MG/DL (ref 0.76–1.27)
GFR SERPL CREATININE-BSD FRML MDRD: 129 ML/MIN/1.73
GLOBULIN UR ELPH-MCNC: 5.1 GM/DL
GLUCOSE SERPL-MCNC: 86 MG/DL (ref 65–99)
POTASSIUM SERPL-SCNC: 4.1 MMOL/L (ref 3.5–5.2)
PROT SERPL-MCNC: 7.8 G/DL (ref 6–8.5)
SODIUM SERPL-SCNC: 138 MMOL/L (ref 136–145)

## 2021-12-04 PROCEDURE — 96365 THER/PROPH/DIAG IV INF INIT: CPT

## 2021-12-04 PROCEDURE — 25010000002 DAPTOMYCIN PER 1 MG: Performed by: INTERNAL MEDICINE

## 2021-12-04 PROCEDURE — 36415 COLL VENOUS BLD VENIPUNCTURE: CPT

## 2021-12-04 PROCEDURE — 80053 COMPREHEN METABOLIC PANEL: CPT | Performed by: FAMILY MEDICINE

## 2021-12-04 RX ADMIN — DAPTOMYCIN 550 MG: 500 INJECTION, POWDER, LYOPHILIZED, FOR SOLUTION INTRAVENOUS at 09:00

## 2021-12-13 ENCOUNTER — TELEPHONE (OUTPATIENT)
Dept: FAMILY MEDICINE CLINIC | Facility: CLINIC | Age: 36
End: 2021-12-13

## 2021-12-13 NOTE — TELEPHONE ENCOUNTER
We have received disability claim form from [employer]. I have left a message with Lotus stating Dr. Loera is out of the office this week and we will not be able to fill out forms until next week. I will go ahead and send medical records information that has been requested. She will call back if any questions.